# Patient Record
Sex: FEMALE | Race: OTHER | NOT HISPANIC OR LATINO
[De-identification: names, ages, dates, MRNs, and addresses within clinical notes are randomized per-mention and may not be internally consistent; named-entity substitution may affect disease eponyms.]

---

## 2017-02-23 ENCOUNTER — NON-APPOINTMENT (OUTPATIENT)
Age: 77
End: 2017-02-23

## 2017-02-23 ENCOUNTER — APPOINTMENT (OUTPATIENT)
Dept: INTERNAL MEDICINE | Facility: CLINIC | Age: 77
End: 2017-02-23

## 2017-02-23 VITALS
OXYGEN SATURATION: 98 % | TEMPERATURE: 98.3 F | WEIGHT: 129 LBS | DIASTOLIC BLOOD PRESSURE: 70 MMHG | SYSTOLIC BLOOD PRESSURE: 200 MMHG | HEIGHT: 60 IN | BODY MASS INDEX: 25.32 KG/M2 | HEART RATE: 68 BPM

## 2017-02-23 LAB — HBA1C MFR BLD HPLC: 8.5

## 2017-02-24 LAB
25(OH)D3 SERPL-MCNC: 37.5 NG/ML
ALBUMIN SERPL ELPH-MCNC: 4.1 G/DL
ALP BLD-CCNC: 103 U/L
ALT SERPL-CCNC: 19 U/L
ANION GAP SERPL CALC-SCNC: 16 MMOL/L
APPEARANCE: CLEAR
AST SERPL-CCNC: 18 U/L
BASOPHILS # BLD AUTO: 0.01 K/UL
BASOPHILS NFR BLD AUTO: 0.1 %
BILIRUB SERPL-MCNC: 0.5 MG/DL
BILIRUBIN URINE: NEGATIVE
BLOOD URINE: NEGATIVE
BUN SERPL-MCNC: 17 MG/DL
CALCIUM SERPL-MCNC: 9.1 MG/DL
CHLORIDE SERPL-SCNC: 101 MMOL/L
CHOLEST SERPL-MCNC: 253 MG/DL
CHOLEST/HDLC SERPL: 3 RATIO
CO2 SERPL-SCNC: 22 MMOL/L
COLOR: YELLOW
CREAT SERPL-MCNC: 0.75 MG/DL
EOSINOPHIL # BLD AUTO: 0.07 K/UL
EOSINOPHIL NFR BLD AUTO: 0.9 %
GLUCOSE QUALITATIVE U: 500 MG/DL
GLUCOSE SERPL-MCNC: 284 MG/DL
HCT VFR BLD CALC: 38.6 %
HDLC SERPL-MCNC: 84 MG/DL
HGB BLD-MCNC: 12.8 G/DL
IMM GRANULOCYTES NFR BLD AUTO: 0.3 %
KETONES URINE: NEGATIVE
LDLC SERPL CALC-MCNC: 144 MG/DL
LEUKOCYTE ESTERASE URINE: NEGATIVE
LYMPHOCYTES # BLD AUTO: 2.1 K/UL
LYMPHOCYTES NFR BLD AUTO: 26.8 %
MAN DIFF?: NORMAL
MCHC RBC-ENTMCNC: 28.5 PG
MCHC RBC-ENTMCNC: 33.2 GM/DL
MCV RBC AUTO: 86 FL
MONOCYTES # BLD AUTO: 0.69 K/UL
MONOCYTES NFR BLD AUTO: 8.8 %
NEUTROPHILS # BLD AUTO: 4.96 K/UL
NEUTROPHILS NFR BLD AUTO: 63.1 %
NITRITE URINE: NEGATIVE
PH URINE: 6.5
PLATELET # BLD AUTO: 263 K/UL
POTASSIUM SERPL-SCNC: 4.4 MMOL/L
PROT SERPL-MCNC: 6.6 G/DL
PROTEIN URINE: NEGATIVE MG/DL
RBC # BLD: 4.49 M/UL
RBC # FLD: 14 %
SODIUM SERPL-SCNC: 139 MMOL/L
SPECIFIC GRAVITY URINE: 1.03
TRIGL SERPL-MCNC: 126 MG/DL
TSH SERPL-ACNC: 1.73 UIU/ML
UROBILINOGEN URINE: NORMAL MG/DL
VIT B12 SERPL-MCNC: 1198 PG/ML
WBC # FLD AUTO: 7.85 K/UL

## 2017-03-01 LAB — HEMOCCULT STL QL IA: NEGATIVE

## 2017-06-02 ENCOUNTER — MEDICATION RENEWAL (OUTPATIENT)
Age: 77
End: 2017-06-02

## 2017-07-24 ENCOUNTER — APPOINTMENT (OUTPATIENT)
Dept: INTERNAL MEDICINE | Facility: CLINIC | Age: 77
End: 2017-07-24

## 2017-09-29 ENCOUNTER — APPOINTMENT (OUTPATIENT)
Dept: INTERNAL MEDICINE | Facility: CLINIC | Age: 77
End: 2017-09-29
Payer: MEDICARE

## 2017-09-29 VITALS
BODY MASS INDEX: 24.94 KG/M2 | TEMPERATURE: 98.3 F | HEIGHT: 60 IN | WEIGHT: 127 LBS | DIASTOLIC BLOOD PRESSURE: 62 MMHG | SYSTOLIC BLOOD PRESSURE: 200 MMHG | OXYGEN SATURATION: 98 % | HEART RATE: 71 BPM

## 2017-09-29 LAB — HBA1C MFR BLD HPLC: 8.3

## 2017-09-29 PROCEDURE — 99214 OFFICE O/P EST MOD 30 MIN: CPT | Mod: 25

## 2017-09-29 PROCEDURE — 83036 HEMOGLOBIN GLYCOSYLATED A1C: CPT | Mod: QW

## 2017-09-29 RX ORDER — METFORMIN ER 500 MG 500 MG/1
500 TABLET ORAL DAILY
Qty: 90 | Refills: 1 | Status: DISCONTINUED | COMMUNITY
Start: 2017-02-23 | End: 2017-09-29

## 2017-10-02 LAB
APPEARANCE: CLEAR
BILIRUBIN URINE: NEGATIVE
BLOOD URINE: NEGATIVE
COLOR: YELLOW
CREAT SPEC-SCNC: 10 MG/DL
GLUCOSE QUALITATIVE U: NORMAL MG/DL
KETONES URINE: NEGATIVE
LEUKOCYTE ESTERASE URINE: NEGATIVE
MICROALBUMIN 24H UR DL<=1MG/L-MCNC: 0.9 MG/DL
MICROALBUMIN/CREAT 24H UR-RTO: 90 MG/G
NITRITE URINE: NEGATIVE
PH URINE: 6
PROTEIN URINE: NEGATIVE MG/DL
SPECIFIC GRAVITY URINE: 1.01
UROBILINOGEN URINE: NORMAL MG/DL

## 2017-12-26 ENCOUNTER — MEDICATION RENEWAL (OUTPATIENT)
Age: 77
End: 2017-12-26

## 2018-02-26 ENCOUNTER — APPOINTMENT (OUTPATIENT)
Dept: INTERNAL MEDICINE | Facility: CLINIC | Age: 78
End: 2018-02-26
Payer: MEDICARE

## 2018-02-26 ENCOUNTER — LABORATORY RESULT (OUTPATIENT)
Age: 78
End: 2018-02-26

## 2018-02-26 VITALS
TEMPERATURE: 98.1 F | BODY MASS INDEX: 25.91 KG/M2 | HEART RATE: 63 BPM | WEIGHT: 132 LBS | SYSTOLIC BLOOD PRESSURE: 160 MMHG | DIASTOLIC BLOOD PRESSURE: 80 MMHG | HEIGHT: 60 IN | OXYGEN SATURATION: 97 %

## 2018-02-26 LAB — HBA1C MFR BLD HPLC: 6.6

## 2018-02-26 PROCEDURE — G0439: CPT | Mod: 25

## 2018-02-26 PROCEDURE — 36415 COLL VENOUS BLD VENIPUNCTURE: CPT

## 2018-02-26 PROCEDURE — 83036 HEMOGLOBIN GLYCOSYLATED A1C: CPT | Mod: QW

## 2018-02-27 LAB
25(OH)D3 SERPL-MCNC: 31.1 NG/ML
ALBUMIN SERPL ELPH-MCNC: 4.1 G/DL
ALP BLD-CCNC: 93 U/L
ALT SERPL-CCNC: 18 U/L
ANION GAP SERPL CALC-SCNC: 15 MMOL/L
APPEARANCE: CLEAR
AST SERPL-CCNC: 22 U/L
BASOPHILS # BLD AUTO: 0.02 K/UL
BASOPHILS NFR BLD AUTO: 0.2 %
BILIRUB SERPL-MCNC: 0.3 MG/DL
BILIRUBIN URINE: NEGATIVE
BLOOD URINE: NEGATIVE
BUN SERPL-MCNC: 18 MG/DL
CALCIUM SERPL-MCNC: 9.3 MG/DL
CHLORIDE SERPL-SCNC: 103 MMOL/L
CHOLEST SERPL-MCNC: 235 MG/DL
CHOLEST/HDLC SERPL: 3.3 RATIO
CO2 SERPL-SCNC: 24 MMOL/L
COLOR: ABNORMAL
CREAT SERPL-MCNC: 0.72 MG/DL
EOSINOPHIL # BLD AUTO: 0.1 K/UL
EOSINOPHIL NFR BLD AUTO: 1.2 %
GLUCOSE QUALITATIVE U: NEGATIVE MG/DL
GLUCOSE SERPL-MCNC: 164 MG/DL
HBA1C MFR BLD HPLC: 6.4 %
HCT VFR BLD CALC: 36.7 %
HDLC SERPL-MCNC: 71 MG/DL
HGB BLD-MCNC: 11.7 G/DL
IMM GRANULOCYTES NFR BLD AUTO: 0.1 %
KETONES URINE: NEGATIVE
LDLC SERPL CALC-MCNC: 126 MG/DL
LEUKOCYTE ESTERASE URINE: NEGATIVE
LYMPHOCYTES # BLD AUTO: 2.22 K/UL
LYMPHOCYTES NFR BLD AUTO: 26.6 %
MAN DIFF?: NORMAL
MCHC RBC-ENTMCNC: 27.8 PG
MCHC RBC-ENTMCNC: 31.9 GM/DL
MCV RBC AUTO: 87.2 FL
MONOCYTES # BLD AUTO: 0.78 K/UL
MONOCYTES NFR BLD AUTO: 9.4 %
NEUTROPHILS # BLD AUTO: 5.21 K/UL
NEUTROPHILS NFR BLD AUTO: 62.5 %
NITRITE URINE: NEGATIVE
PH URINE: 6
PLATELET # BLD AUTO: 276 K/UL
POTASSIUM SERPL-SCNC: 4.8 MMOL/L
PROT SERPL-MCNC: 6.8 G/DL
PROTEIN URINE: NEGATIVE MG/DL
RBC # BLD: 4.21 M/UL
RBC # FLD: 14.3 %
SODIUM SERPL-SCNC: 142 MMOL/L
SPECIFIC GRAVITY URINE: 1.02
TRIGL SERPL-MCNC: 188 MG/DL
TSH SERPL-ACNC: 1.87 UIU/ML
UROBILINOGEN URINE: NEGATIVE MG/DL
WBC # FLD AUTO: 8.34 K/UL

## 2018-03-07 LAB — HEMOCCULT STL QL IA: NEGATIVE

## 2018-03-12 ENCOUNTER — APPOINTMENT (OUTPATIENT)
Dept: OPHTHALMOLOGY | Facility: CLINIC | Age: 78
End: 2018-03-12

## 2018-03-12 ENCOUNTER — APPOINTMENT (OUTPATIENT)
Dept: INTERNAL MEDICINE | Facility: CLINIC | Age: 78
End: 2018-03-12
Payer: MEDICARE

## 2018-03-12 VITALS
HEIGHT: 60 IN | OXYGEN SATURATION: 97 % | WEIGHT: 132 LBS | BODY MASS INDEX: 25.91 KG/M2 | SYSTOLIC BLOOD PRESSURE: 160 MMHG | DIASTOLIC BLOOD PRESSURE: 70 MMHG | TEMPERATURE: 97.9 F | HEART RATE: 66 BPM

## 2018-03-12 PROCEDURE — 99214 OFFICE O/P EST MOD 30 MIN: CPT

## 2018-07-18 ENCOUNTER — APPOINTMENT (OUTPATIENT)
Dept: INTERNAL MEDICINE | Facility: CLINIC | Age: 78
End: 2018-07-18
Payer: MEDICARE

## 2018-07-18 VITALS
OXYGEN SATURATION: 98 % | HEART RATE: 66 BPM | WEIGHT: 136 LBS | HEIGHT: 60 IN | TEMPERATURE: 98.2 F | DIASTOLIC BLOOD PRESSURE: 70 MMHG | SYSTOLIC BLOOD PRESSURE: 180 MMHG | BODY MASS INDEX: 26.7 KG/M2

## 2018-07-18 PROCEDURE — 36415 COLL VENOUS BLD VENIPUNCTURE: CPT

## 2018-07-18 PROCEDURE — 99214 OFFICE O/P EST MOD 30 MIN: CPT | Mod: 25

## 2018-07-18 NOTE — ASSESSMENT
[FreeTextEntry1] : \par Hypertension- 180/70 took medications today  \par -uncontrolled confirmed , denies CP SOB or dizzy spells \par -Educate the patient side effects of uncontrolled high blood pressure including coronary artery disease, kidney failure requiring dialysis , stroke causing paralysis and death , pt verbalized agreed to be compliant with medications \par - advised if chest tightness or pain or palpitation to go to ER \par -Elevated today,  discussed low-salt diet, \par -on losartan, 100 mg daily, and amlodipine 10 mg daily - refused any change in medications \par -Followup in 3 months  to check blood pressure\par \par DM type 2 - get AIC \par -ophtho referral given \par -  discussed compliance with medications \par -Educated patient complications of uncontrolled diabetes including coronary artery disease, kidney failure, blindness, neuropathy, etc\par -Educated compliance with diet and medication\par -Also advised to make appointment to see ophthalmologist to rule out retinopathy\par -on  glipizide 2.5 1/2 tab  daily \par -refused pneumonia vaccine and flu vaccine \par -Advice to take statin and ACE inhibitor-- patient refused statins, \par \par Insomnia- sleep hygeine reviewed \par - start melatonin - increase to 8mg daily \par \par Health maintenance\par -Patient verbalizes understands risk of cancer-refused colonoscopy, mammogram, Pap smear, and all vaccinations. FIT negative \par FIT 2018 feb negative \par

## 2018-07-18 NOTE — HISTORY OF PRESENT ILLNESS
[de-identified] : c/o insomnia - goes to bed at 10- 11 - falls a sleep for 10 minutes then has problem going back to sleep till am \par \par DM- \par - taking glipizide 2.5 daily 1/2 tablet \par -since 5 yrs not taking metformin 500 -when ever she takes it her stomach crams and diarrhea , sedentary life , last eye exam was 3 years ago, when she had her cataract surgery, does not believe in vaccination\par -Does not monitor her sugars\par -Tries to eat low carbohydrate diet\par \par HTN- x 3 yrs taking amlodipine 10 mg ,and taking losartan 50 > 1 yr took medication this am 9 for last 2 weeks \par -Denies any chest pain, palpitation, or dizzy spells, + sob with exertion and increased stress for last 3 weeks due to family issue , No h/o asthma \par -used to see cardiology last visit was 4 years ago , cardiac work up was 15 yrs ago \par

## 2018-07-19 LAB
ANION GAP SERPL CALC-SCNC: 16 MMOL/L
BUN SERPL-MCNC: 21 MG/DL
CALCIUM SERPL-MCNC: 9.7 MG/DL
CHLORIDE SERPL-SCNC: 104 MMOL/L
CO2 SERPL-SCNC: 23 MMOL/L
CREAT SERPL-MCNC: 0.85 MG/DL
CREAT SPEC-SCNC: 75 MG/DL
GLUCOSE SERPL-MCNC: 240 MG/DL
HBA1C MFR BLD HPLC: 7.7 %
MICROALBUMIN 24H UR DL<=1MG/L-MCNC: 3 MG/DL
MICROALBUMIN/CREAT 24H UR-RTO: 40 MG/G
POTASSIUM SERPL-SCNC: 4.9 MMOL/L
SODIUM SERPL-SCNC: 143 MMOL/L

## 2018-08-16 ENCOUNTER — APPOINTMENT (OUTPATIENT)
Dept: OPHTHALMOLOGY | Facility: CLINIC | Age: 78
End: 2018-08-16

## 2019-02-25 ENCOUNTER — MEDICATION RENEWAL (OUTPATIENT)
Age: 79
End: 2019-02-25

## 2019-02-27 ENCOUNTER — APPOINTMENT (OUTPATIENT)
Dept: INTERNAL MEDICINE | Facility: CLINIC | Age: 79
End: 2019-02-27
Payer: MEDICARE

## 2019-02-27 VITALS
OXYGEN SATURATION: 98 % | SYSTOLIC BLOOD PRESSURE: 140 MMHG | HEART RATE: 66 BPM | DIASTOLIC BLOOD PRESSURE: 70 MMHG | BODY MASS INDEX: 26.31 KG/M2 | TEMPERATURE: 97.9 F | HEIGHT: 60 IN | WEIGHT: 134 LBS

## 2019-02-27 PROCEDURE — G0439: CPT | Mod: 25

## 2019-02-27 PROCEDURE — G0444 DEPRESSION SCREEN ANNUAL: CPT

## 2019-02-27 PROCEDURE — 36415 COLL VENOUS BLD VENIPUNCTURE: CPT

## 2019-02-27 NOTE — HISTORY OF PRESENT ILLNESS
[FreeTextEntry1] : came in for annual check up \par gets medications form [facility]  [de-identified] : c/o insomnia - goes to bed at 10- 11 - falls a sleep for 10 minutes then has problem going back to sleep till am \par \par DM- \par - still taking glipizide 2.5 daily 1/2 tablet was told to increase to 1 tab 7/2018 after AIC at visit was 7.7 \par -hx of non compliance with mediations in past  not taking metformin 500 -when ever she takes it her stomach crams and diarrhea , sedentary life , last eye exam was 3 years ago, when she had her cataract surgery, does not believe in vaccination\par -Does not monitor her sugars\par -Tries to eat low carbohydrate diet\par \par HTN- as per pt she was in [place] 3 months [place] and doing homeopathic rx and her bp was 130/70 there therefore stopped losartan \par -only  taking amlodipine 10 mg stopped taking losartan 50 > 6 months \par -Denies any chest pain, palpitation, or dizzy spells, + sob with exertion and increased stress for last 3 weeks due to family issue , No h/o asthma \par -used to see cardiology last visit was 4 years ago , cardiac work up was 15 yrs ago \par

## 2019-02-27 NOTE — ASSESSMENT
[FreeTextEntry1] : \par Hypertension- 200/70 due to non comp with medications \par -uncontrolled confirmed , denies CP SOB or dizzy spells \par -Educate the patient side effects of uncontrolled high blood pressure including coronary artery disease, kidney failure requiring dialysis , stroke causing paralysis and death , pt verbalized agreed to be compliant with medications \par - advised if chest tightness or pain or palpitation to go to ER \par -Elevated today,  discussed low-salt diet, \par -restart losartan, 100 mg daily, and continue amlodipine 10 mg daily - refused any change in medications \par -Followup in 3 months  to check blood pressure\par \par DM type 2 - get AIC \par -ophtho referral given \par -  discussed compliance with medications \par -Educated patient complications of uncontrolled diabetes including coronary artery disease, kidney failure, blindness, neuropathy, etc\par -Educated compliance with diet and medication\par -Also advised to make appointment to see ophthalmologist to rule out retinopathy\par -on  glipizide 2.5 1/2 tab  daily \par -refused pneumonia vaccine and flu vaccine \par -Advice to take statin and ACE inhibitor-- patient refused statins, \par \par Insomnia- sleep hygeine reviewed \par - start melatonin - increase to 8mg daily \par \par Health maintenance\par -Patient verbalizes understands risk of cancer-refused colonoscopy, mammogram, Pap smear, and all vaccinations. FIT negative \par FIT 2018 feb negative \par

## 2019-02-27 NOTE — COUNSELING
[Weight management counseling provided] : Weight management [Healthy eating counseling provided] : healthy eating [Activity counseling provided] : activity [Patient Non-adherent to care plan] : Patient non-adherent to care plan [Patient motivation] : Patient motivation

## 2019-02-27 NOTE — HEALTH RISK ASSESSMENT
[Good] : ~his/her~  mood as  good [No falls in past year] : Patient reported no falls in the past year [0] : 2) Feeling down, depressed, or hopeless: Not at all (0) [Patient declined mammogram] : Patient declined mammogram [Patient declined PAP Smear] : Patient declined PAP Smear [Patient declined bone density test] : Patient declined bone density test [Patient declined colonoscopy] : Patient declined colonoscopy [Retired] : retired [Single] : single [Fully functional (bathing, dressing, toileting, transferring, walking, feeding)] : Fully functional (bathing, dressing, toileting, transferring, walking, feeding) [Fully functional (using the telephone, shopping, preparing meals, housekeeping, doing laundry, using] : Fully functional and needs no help or supervision to perform IADLs (using the telephone, shopping, preparing meals, housekeeping, doing laundry, using transportation, managing medications and managing finances) [] : No [[id number]] : 0 [Reports changes in hearing] : Reports no changes in hearing [Reports changes in vision] : Reports no changes in vision [Reports changes in dental health] : Reports no changes in dental health [de-identified] : with son

## 2019-02-28 LAB
ALBUMIN SERPL ELPH-MCNC: 4.2 G/DL
ALP BLD-CCNC: 103 U/L
ALT SERPL-CCNC: 21 U/L
ANION GAP SERPL CALC-SCNC: 16 MMOL/L
APPEARANCE: CLEAR
AST SERPL-CCNC: 22 U/L
BASOPHILS # BLD AUTO: 0.03 K/UL
BASOPHILS NFR BLD AUTO: 0.4 %
BILIRUB SERPL-MCNC: 0.4 MG/DL
BILIRUBIN URINE: NEGATIVE
BLOOD URINE: NEGATIVE
BUN SERPL-MCNC: 18 MG/DL
CALCIUM SERPL-MCNC: 9.5 MG/DL
CHLORIDE SERPL-SCNC: 100 MMOL/L
CHOLEST SERPL-MCNC: 266 MG/DL
CHOLEST/HDLC SERPL: 3.6 RATIO
CO2 SERPL-SCNC: 23 MMOL/L
COLOR: YELLOW
CREAT SERPL-MCNC: 0.65 MG/DL
EOSINOPHIL # BLD AUTO: 0.12 K/UL
EOSINOPHIL NFR BLD AUTO: 1.5 %
GLUCOSE QUALITATIVE U: ABNORMAL
GLUCOSE SERPL-MCNC: 254 MG/DL
HBA1C MFR BLD HPLC: 7.7 %
HCT VFR BLD CALC: 42.5 %
HDLC SERPL-MCNC: 74 MG/DL
HGB BLD-MCNC: 12.9 G/DL
IMM GRANULOCYTES NFR BLD AUTO: 0.4 %
KETONES URINE: NEGATIVE
LDLC SERPL CALC-MCNC: 163 MG/DL
LEUKOCYTE ESTERASE URINE: NEGATIVE
LYMPHOCYTES # BLD AUTO: 2.36 K/UL
LYMPHOCYTES NFR BLD AUTO: 28.7 %
MAN DIFF?: NORMAL
MCHC RBC-ENTMCNC: 27.7 PG
MCHC RBC-ENTMCNC: 30.4 GM/DL
MCV RBC AUTO: 91.4 FL
MONOCYTES # BLD AUTO: 0.8 K/UL
MONOCYTES NFR BLD AUTO: 9.7 %
NEUTROPHILS # BLD AUTO: 4.88 K/UL
NEUTROPHILS NFR BLD AUTO: 59.3 %
NITRITE URINE: NEGATIVE
PH URINE: 7
PLATELET # BLD AUTO: 259 K/UL
POTASSIUM SERPL-SCNC: 4.9 MMOL/L
PROT SERPL-MCNC: 6.7 G/DL
PROTEIN URINE: NORMAL
RBC # BLD: 4.65 M/UL
RBC # FLD: 13.2 %
SODIUM SERPL-SCNC: 139 MMOL/L
SPECIFIC GRAVITY URINE: 1.02
TRIGL SERPL-MCNC: 147 MG/DL
TSH SERPL-ACNC: 1.85 UIU/ML
UROBILINOGEN URINE: NORMAL
VIT B12 SERPL-MCNC: 1665 PG/ML
WBC # FLD AUTO: 8.22 K/UL

## 2019-03-03 LAB — 25(OH)D3 SERPL-MCNC: 37.9 NG/ML

## 2019-08-02 ENCOUNTER — APPOINTMENT (OUTPATIENT)
Dept: INTERNAL MEDICINE | Facility: CLINIC | Age: 79
End: 2019-08-02

## 2019-08-03 ENCOUNTER — OTHER (OUTPATIENT)
Age: 79
End: 2019-08-03

## 2019-08-13 ENCOUNTER — APPOINTMENT (OUTPATIENT)
Dept: INTERNAL MEDICINE | Facility: CLINIC | Age: 79
End: 2019-08-13

## 2019-09-06 ENCOUNTER — APPOINTMENT (OUTPATIENT)
Dept: INTERNAL MEDICINE | Facility: CLINIC | Age: 79
End: 2019-09-06
Payer: MEDICARE

## 2019-09-06 VITALS
BODY MASS INDEX: 25.32 KG/M2 | OXYGEN SATURATION: 98 % | TEMPERATURE: 97.9 F | SYSTOLIC BLOOD PRESSURE: 156 MMHG | DIASTOLIC BLOOD PRESSURE: 64 MMHG | WEIGHT: 129 LBS | HEIGHT: 60 IN | HEART RATE: 75 BPM

## 2019-09-06 PROCEDURE — 36415 COLL VENOUS BLD VENIPUNCTURE: CPT

## 2019-09-06 PROCEDURE — 99214 OFFICE O/P EST MOD 30 MIN: CPT | Mod: 25

## 2019-09-06 NOTE — HISTORY OF PRESENT ILLNESS
[de-identified] : went to hospital - 8/3/19 with left face numbness - was admitted dx as stroke - monitored her and let her go next day - dx as TIA \par -before this pt was not taking her losartan asd she saw in news it was carcinogenic - 3 months \par -was discharged on Asprin 81 , amlodipine 10 and lisinopril 10 \par \par DM- \par - still taking glipizide 2.5 daily 1/2 tablet daily \par -hx of non compliance with mediations in past not taking metformin 500 -when ever she takes it her stomach crams and diarrhea , sedentary life , last eye exam was 3 years ago, when she had her cataract surgery, does not believe in vaccination\par -Does not monitor her sugars\par -Tries to eat low carbohydrate diet\par \par HTN- \par -only taking amlodipine 10 mg , stopped losartan > 6 months - started on lisinopril 10 from ER 8/2019 ran out 2 days ago \par -Denies any chest pain, palpitation, or dizzy spells, + sob with exertion and increased stress for last 3 weeks due to family issue , No h/o asthma \par -used to see cardiology last visit was 4 years ago , cardiac work up was 15 yrs ago \par

## 2019-09-06 NOTE — ASSESSMENT
[FreeTextEntry1] : hx TIA 8/2019 - self discontinued asprin \par - educated to restart \par \par Hypertension- 170/70  due to non comp with medications \par -uncontrolled confirmed , denies CP SOB or dizzy spells \par -Educate the patient side effects of uncontrolled high blood pressure including coronary artery disease, kidney failure requiring dialysis , stroke causing paralysis and death , pt verbalized agreed to be compliant with medications \par - advised if chest tightness or pain or palpitation to go to ER \par -Elevated today, discussed low-salt diet, \par -restart lisinopril 10 mg daily, and continue amlodipine 10 mg daily - refused any change in medications \par -Followup in 3 months to check blood pressure\par \par DM type 2 - get AIC \par -ophtho referral given \par - discussed compliance with medications \par -Educated patient complications of uncontrolled diabetes including coronary artery disease, kidney failure, blindness, neuropathy, etc\par -Educated compliance with diet and medication\par -Also advised to make appointment to see ophthalmologist to rule out retinopathy\par -on glipizide 2.5 1/2 tab daily \par -refused pneumonia vaccine and flu vaccine \par -Advice to take statin and ACE inhibitor-- patient refused statins, \par \par Insomnia- sleep hygeine reviewed \par - start melatonin - increase to 8mg daily \par \par Health maintenance\par -Patient verbalizes understands risk of cancer-refused colonoscopy, mammogram, Pap smear, and all vaccinations. FIT negative \par FIT 2018 feb negative \par

## 2019-09-09 ENCOUNTER — MEDICATION RENEWAL (OUTPATIENT)
Age: 79
End: 2019-09-09

## 2019-09-09 LAB
ALBUMIN SERPL ELPH-MCNC: 4.6 G/DL
ALP BLD-CCNC: 99 U/L
ALT SERPL-CCNC: 15 U/L
ANION GAP SERPL CALC-SCNC: 15 MMOL/L
AST SERPL-CCNC: 18 U/L
BILIRUB SERPL-MCNC: 0.3 MG/DL
BUN SERPL-MCNC: 16 MG/DL
CALCIUM SERPL-MCNC: 9.8 MG/DL
CHLORIDE SERPL-SCNC: 104 MMOL/L
CHOLEST SERPL-MCNC: 260 MG/DL
CHOLEST/HDLC SERPL: 3.3 RATIO
CO2 SERPL-SCNC: 22 MMOL/L
CREAT SERPL-MCNC: 0.67 MG/DL
CREAT SPEC-SCNC: 40 MG/DL
ESTIMATED AVERAGE GLUCOSE: 157 MG/DL
GLUCOSE SERPL-MCNC: 140 MG/DL
HBA1C MFR BLD HPLC: 7.1 %
HDLC SERPL-MCNC: 78 MG/DL
LDLC SERPL CALC-MCNC: 151 MG/DL
MICROALBUMIN 24H UR DL<=1MG/L-MCNC: 3.4 MG/DL
MICROALBUMIN/CREAT 24H UR-RTO: 84 MG/G
POTASSIUM SERPL-SCNC: 4.8 MMOL/L
PROT SERPL-MCNC: 7.4 G/DL
SODIUM SERPL-SCNC: 141 MMOL/L
TRIGL SERPL-MCNC: 157 MG/DL

## 2019-09-09 RX ORDER — ASPIRIN 81 MG/1
81 TABLET, COATED ORAL
Qty: 30 | Refills: 0 | Status: ACTIVE | COMMUNITY
Start: 2019-08-03

## 2019-10-31 ENCOUNTER — APPOINTMENT (OUTPATIENT)
Dept: INTERNAL MEDICINE | Facility: CLINIC | Age: 79
End: 2019-10-31
Payer: MEDICARE

## 2019-10-31 ENCOUNTER — RX RENEWAL (OUTPATIENT)
Age: 79
End: 2019-10-31

## 2019-10-31 VITALS
TEMPERATURE: 98.1 F | HEART RATE: 69 BPM | SYSTOLIC BLOOD PRESSURE: 140 MMHG | OXYGEN SATURATION: 97 % | DIASTOLIC BLOOD PRESSURE: 50 MMHG | BODY MASS INDEX: 25.91 KG/M2 | HEIGHT: 60 IN | WEIGHT: 132 LBS

## 2019-10-31 PROCEDURE — 99214 OFFICE O/P EST MOD 30 MIN: CPT

## 2019-10-31 NOTE — ASSESSMENT
[FreeTextEntry1] : hx TIA 8/2019 - self discontinued asprin \par - educated risk of stroke - pt verbalised understands does not believe in Pharmasuticals \par \par .high clolesterol- did not start atorvastatin she read up on article in [place] - statin causes Alzheiners dementia - she is willing to risk stroke than to get AD - she will try natural rx redyeast \par \par Hypertension- 150/60 \par -uncontrolled confirmed , denies CP SOB or dizzy spells \par -Educate the patient side effects of uncontrolled high blood pressure including coronary artery disease, kidney failure requiring dialysis , stroke causing paralysis and death , pt verbalized agreed to be compliant with medications \par - advised if chest tightness or pain or palpitation to go to ER \par -Elevated today, discussed low-salt diet, \par -restart lisinopril 10 mg daily, and continue amlodipine 10 mg daily - refused any change in medications \par -Followup in 3 months to check blood pressure\par \par DM type 2 -AIC 7.1 \par -ophtho referral given \par - discussed compliance with medications \par -Educated patient complications of uncontrolled diabetes including coronary artery disease, kidney failure, blindness, neuropathy, etc\par -Educated compliance with diet and medication\par -Also advised to make appointment to see ophthalmologist to rule out retinopathy\par -on glipizide 2.5 1/2 tab daily \par -refused pneumonia vaccine and flu vaccine \par -Advice to take statin and ACE inhibitor-- patient refused statins, \par \par Insomnia- sleep hygeine reviewed \par - start melatonin - increase to 8mg daily \par \par Health maintenance\par -Patient verbalizes understands risk of cancer-refused colonoscopy, mammogram, Pap smear, and all vaccinations. FIT negative \par FIT 2018 feb negative

## 2019-10-31 NOTE — HISTORY OF PRESENT ILLNESS
[Other: _____] : [unfilled] [de-identified] : hx TIA 8/2019 - was told to take  Asprin 81 daily - pt self discontinued -never started atorvastatin as discuseed last visit - does not belive in pharmasuticals - only like to take natural rx - pt educated risk of stroke - verbalised understands refuses to take asprin or statin \par \par DM- \par - still taking glipizide 2.5 daily 1/2 tablet daily \par -hx of non compliance with mediations in past not taking metformin 500 -when ever she takes it her stomach crams and diarrhea , sedentary life , last eye exam was 3 years ago, when she had her cataract surgery, does not believe in vaccination\par -Does not monitor her sugars\par -Tries to eat low carbohydrate diet\par \par HTN- \par -taking amlodipine 10 mg ,and lisinopril 10 from ER 8/2019 \par -Denies any chest pain, palpitation, or dizzy spells, + sob with exertion and increased stress for last 3 weeks due to family issue , No h/o asthma \par -used to see cardiology last visit was 4 years ago , cardiac work up was 15 yrs ago

## 2020-02-26 ENCOUNTER — APPOINTMENT (OUTPATIENT)
Dept: INTERNAL MEDICINE | Facility: CLINIC | Age: 80
End: 2020-02-26
Payer: MEDICARE

## 2020-02-26 VITALS
WEIGHT: 131 LBS | DIASTOLIC BLOOD PRESSURE: 50 MMHG | OXYGEN SATURATION: 98 % | HEART RATE: 66 BPM | BODY MASS INDEX: 25.72 KG/M2 | HEIGHT: 60 IN | TEMPERATURE: 98.7 F | SYSTOLIC BLOOD PRESSURE: 160 MMHG

## 2020-02-26 PROCEDURE — 99214 OFFICE O/P EST MOD 30 MIN: CPT | Mod: 25

## 2020-02-26 PROCEDURE — 36415 COLL VENOUS BLD VENIPUNCTURE: CPT

## 2020-02-26 NOTE — ASSESSMENT
[FreeTextEntry1] : cough with white phlem - rx for robutussin DM send x 1 week \par \par hx TIA 8/2019 - self discontinued asprin \par - educated risk of stroke - pt verbalised understands does not believe in Pharmasuticals \par \par .high cholesterol- did not start atorvastatin she read up on article in [place] - statin causes Alzheimers dementia - she is willing to risk stroke than to get AD - she will try natural rx red yeast \par \par Hypertension- 160/60 today \par -uncontrolled confirmed , denies CP SOB or dizzy spells \par -Educate the patient side effects of uncontrolled high blood pressure including coronary artery disease, kidney failure requiring dialysis , stroke causing paralysis and death , pt verbalized agreed to be compliant with medications \par - advised if chest tightness or pain or palpitation to go to ER \par -Elevated today, discussed low-salt diet, \par -refilled lisinopril 10 mg daily, and continue amlodipine 10 mg daily - refused any change in medications \par -Followup in 3 months to check blood pressure\par \par DM type 2 -AIC 7.1 \par -ophtho referral given \par - discussed compliance with medications \par -Educated patient complications of uncontrolled diabetes including coronary artery disease, kidney failure, blindness, neuropathy, etc\par -Educated compliance with diet and medication\par -Also advised to make appointment to see ophthalmologist to rule out retinopathy\par -on glipizide 2.5 1/2 tab daily \par -refused pneumonia vaccine and flu vaccine \par -Advice to take statin and ACE inhibitor-- patient refused statins, \par \par Insomnia- sleep hygeine reviewed \par - start melatonin - increase to 8mg daily \par \par Health maintenance\par -Patient verbalizes understands risk of cancer-refused colonoscopy, mammogram, Pap smear, and all vaccinations. FIT negative \par FIT 2018 feb negative. \par \par

## 2020-02-26 NOTE — HISTORY OF PRESENT ILLNESS
[de-identified] : came in for appt. 2 days early - cannot do CPE as CPE due after 2/27 did F/u on ch medical issues \par \par c/o productive cough - white phelm x few days - no fevr , PND or congestion , no sore throat no  or GI symptoms \par \par hx TIA 8/2019 - was told to take Asprin 81 daily - pt self discontinued -never started atorvastatin as discussed last visit - does not believe in pharmaceutical - only like to take natural rx - pt educated risk of stroke - verbalized understands refuses to take aspirin or statin \par \par DM- \par - still taking glipizide 2.5 daily 1/2 tablet daily \par -hx of non compliance with mediations in past not taking metformin 500 -when ever she takes it her stomach crams and diarrhea , sedentary life , last eye exam was 3 years ago, when she had her cataract surgery, does not believe in vaccination\par -Does not monitor her sugars\par -Tries to eat low carbohydrate diet\par \par HTN- \par -taking amlodipine 10 mg ,and lisinopril 10 from ER 8/2019 \par -Denies any chest pain, palpitation, or dizzy spells, + sob with exertion and increased stress for last 3 weeks due to family issue , No h/o asthma \par -used to see cardiology last visit was 4 years ago , cardiac work up was 15 yrs ago \par  \par

## 2020-02-27 LAB
CHOLEST SERPL-MCNC: 246 MG/DL
CHOLEST/HDLC SERPL: 3.4 RATIO
ESTIMATED AVERAGE GLUCOSE: 157 MG/DL
HBA1C MFR BLD HPLC: 7.1 %
HDLC SERPL-MCNC: 72 MG/DL
LDLC SERPL CALC-MCNC: 150 MG/DL
TRIGL SERPL-MCNC: 122 MG/DL

## 2020-04-27 ENCOUNTER — APPOINTMENT (OUTPATIENT)
Dept: INTERNAL MEDICINE | Facility: CLINIC | Age: 80
End: 2020-04-27
Payer: MEDICARE

## 2020-04-27 VITALS
SYSTOLIC BLOOD PRESSURE: 180 MMHG | OXYGEN SATURATION: 98 % | DIASTOLIC BLOOD PRESSURE: 60 MMHG | RESPIRATION RATE: 12 BRPM | HEART RATE: 58 BPM

## 2020-04-27 PROCEDURE — 99214 OFFICE O/P EST MOD 30 MIN: CPT

## 2020-04-27 NOTE — ASSESSMENT
[FreeTextEntry1] : 78 y/o female, DM, HTN, poorly controlled with hx of TIA in past (declines ASA and statin therapy), here with 2-3 episodes of tingling on her face in the past week.  No neurologic deficits on exam and patient is well appearing.  Symptoms may be TIA, or intermittenr facial nerve pain.  Given hx, advised maximiazing control of CV risk factors\par \par -discussed again the role of ASA and statin in decreasing Stroke and overall CV risk: patient declines\par \par -DM controlled on current meds\par \par -HTN uncontrolled:  advised: continue amlodipine 10mg daily and increase lisinopril to 20mg daily (new rx sent)\par  f/u in 1 month for BP check and labs\par

## 2020-04-27 NOTE — PHYSICAL EXAM
[No Acute Distress] : no acute distress [Well-Appearing] : well-appearing [Normal Sclera/Conjunctiva] : normal sclera/conjunctiva [PERRL] : pupils equal round and reactive to light [EOMI] : extraocular movements intact [No Lymphadenopathy] : no lymphadenopathy [Supple] : supple [Normal Oropharynx] : the oropharynx was normal [Clear to Auscultation] : lungs were clear to auscultation bilaterally [No Respiratory Distress] : no respiratory distress  [Regular Rhythm] : with a regular rhythm [Normal Rate] : normal rate  [Normal S1, S2] : normal S1 and S2 [No Edema] : there was no peripheral edema [Coordination Grossly Intact] : coordination grossly intact [de-identified] : cn grossly intact [Normal Gait] : normal gait [No Focal Deficits] : no focal deficits

## 2020-04-27 NOTE — HISTORY OF PRESENT ILLNESS
[FreeTextEntry8] : 80 y/o female with hx of TIA 8/2019 (declined ASA, statin), DM (a1c 7.1 in Feb 2020), HTN (poorly controlled in past)\par here for evaluation.\par \par Daughter contacted office on 4/24, stating patient complaining of not feeling well.  reported tinlging around eye and on face.  Given EDVIN hx/risk of stroke, advised ED evaluation, patient refused this and UC and requested appointment for eval.  \par \par Today accompanied by her two adult children\par \par Med Rec:  takes amlodipine 10 and lisinipril 10 at 7pm, glipizide ER 2.5 at 3pm\par Does not take ASA or statin, sometimes use RYR.\par \par Reports two weeks ago she had an episode of tingling on the left side of her face (beneath left eye).  No pain, no facial droop, no change in speech.  no weakness or other symptoms.  took her medicines and went to bed and symptoms resolved in a few hours.  Had another episode one week ago and again on Wednesday.  All epsiodes witnessed by her son, who reports she is otherwise normal during the events.  Patient states she is anxious at the time of the episodes and feels weak, but denies CP, SOB, dizziness, palpiations or or other symptoms with them\par \par Denies fevers, chills, cough, stomach upset or myalgias\par \par Son checks BPS at home, last two readings 176 and 180 systolic.  \par Last BP in office 169 systolic.

## 2020-05-26 ENCOUNTER — APPOINTMENT (OUTPATIENT)
Dept: INTERNAL MEDICINE | Facility: CLINIC | Age: 80
End: 2020-05-26

## 2020-06-08 ENCOUNTER — APPOINTMENT (OUTPATIENT)
Dept: INTERNAL MEDICINE | Facility: CLINIC | Age: 80
End: 2020-06-08
Payer: MEDICARE

## 2020-06-08 VITALS — SYSTOLIC BLOOD PRESSURE: 150 MMHG | DIASTOLIC BLOOD PRESSURE: 60 MMHG

## 2020-06-08 VITALS
DIASTOLIC BLOOD PRESSURE: 60 MMHG | HEIGHT: 60 IN | OXYGEN SATURATION: 98 % | SYSTOLIC BLOOD PRESSURE: 160 MMHG | HEART RATE: 60 BPM | WEIGHT: 137 LBS | BODY MASS INDEX: 26.9 KG/M2 | TEMPERATURE: 98 F

## 2020-06-08 PROCEDURE — 36415 COLL VENOUS BLD VENIPUNCTURE: CPT

## 2020-06-08 PROCEDURE — 99214 OFFICE O/P EST MOD 30 MIN: CPT | Mod: 25

## 2020-06-08 RX ORDER — DEXTROMETHORPHAN HBR, GUAIFENESIN 20; 200 MG/10ML; MG/10ML
10-100 SOLUTION ORAL EVERY 4 HOURS
Qty: 1 | Refills: 0 | Status: DISCONTINUED | COMMUNITY
Start: 2020-02-26 | End: 2020-06-08

## 2020-06-08 RX ORDER — LISINOPRIL 20 MG/1
20 TABLET ORAL DAILY
Qty: 90 | Refills: 1 | Status: DISCONTINUED | COMMUNITY
Start: 2019-09-06 | End: 2020-06-08

## 2020-06-08 NOTE — HISTORY OF PRESENT ILLNESS
[Other: _____] : [unfilled] [de-identified] : f/u on ch medical issues \par \par c/o cough dry to white Phleum - sp at night bad cough x 4-6 months, took Robitussin no help \par  \par hx TIA 8/2019 -had a scare in April with left face numbness \par  was told to take Asprin 81 daily - pt self discontinued -never started atorvastatin as discussed last visit - does not believe in pharmaceutical - only like to take natural rx - pt educated risk of stroke - verbalized understands refuses to take aspirin or statin \par \par DM- \par - still taking glipizide 2.5 daily 1/2 tablet daily \par -hx of non compliance with mediations in past not taking metformin 500 -when ever she takes it her stomach crams and diarrhea , sedentary life , last eye exam was 3 years ago, when she had her cataract surgery, does not believe in vaccination\par -Does not monitor her sugars\par -Tries to eat low carbohydrate diet\par \par HTN- \par -taking amlodipine 10 mg ,and lisinopril 10 from ER 8/2019 \par -Denies any chest pain, palpitation, or dizzy spells, + sob with exertion and increased stress for last 3 weeks due to family issue , No h/o asthma \par -used to see cardiology last visit was 4 years ago , cardiac work up was 15 yrs ago \par

## 2020-06-08 NOTE — ASSESSMENT
[FreeTextEntry1] : Dry cough could be ACEI - will d/c lisinopril start Losartan HCTZ 100-12.5 \par -f/u 4 weeks to check BP and cough \par - trial of Tessalon pearls \par \par hx TIA 8/2019 - self discontinued asprin and statin \par - educated risk of stroke - pt verbalized understands does not believe in Pharmacists \par \par .high cholesterol- did not start atorvastatin she read up on article in [place] - statin causes Alzheimers dementia - she is willing to risk stroke than to get AD - she will try natural rx red yeast \par -get Lipid panel \par \par Hypertension- 150/60 today \par -uncontrolled confirmed , denies CP SOB or dizzy spells \par -Educate the patient side effects of uncontrolled high blood pressure including coronary artery disease, kidney failure requiring dialysis , stroke causing paralysis and death , pt verbalized agreed to be compliant with medications \par - advised if chest tightness or pain or palpitation to go to ER \par -Elevated today, discussed low-salt diet, \par -refilled lisinopril 10 mg daily, and continue amlodipine 10 mg daily - refused any change in medications \par -Followup in 3 months to check blood pressure\par \par DM type 2 -get AIC \par -AIC 7.1 2/2020\par -ophtho referral given \par - discussed compliance with medications \par -Educated patient complications of uncontrolled diabetes including coronary artery disease, kidney failure, blindness, neuropathy, etc\par -Educated compliance with diet and medication\par -Also advised to make appointment to see ophthalmologist to rule out retinopathy\par -on glipizide 2.5 1/2 tab daily \par -refused pneumonia vaccine and flu vaccine \par -Advice to take statin and ACE inhibitor-- patient refused statins, \par \par Insomnia- sleep hygienes reviewed \par - start melatonin - increase to 8mg daily \par \par Health maintenance\par -Patient verbalizes understands risk of cancer-refused colonoscopy, mammogram, Pap smear, and all vaccinations. FIT negative \par FIT 2018 feb negative. \par

## 2020-06-09 LAB
25(OH)D3 SERPL-MCNC: 49.1 NG/ML
ALBUMIN SERPL ELPH-MCNC: 4.4 G/DL
ALP BLD-CCNC: 88 U/L
ALT SERPL-CCNC: 15 U/L
ANION GAP SERPL CALC-SCNC: 14 MMOL/L
APPEARANCE: CLEAR
AST SERPL-CCNC: 17 U/L
BASOPHILS # BLD AUTO: 0.02 K/UL
BASOPHILS NFR BLD AUTO: 0.2 %
BILIRUB SERPL-MCNC: 0.2 MG/DL
BILIRUBIN URINE: NEGATIVE
BLOOD URINE: NEGATIVE
BUN SERPL-MCNC: 21 MG/DL
CALCIUM SERPL-MCNC: 9.1 MG/DL
CHLORIDE SERPL-SCNC: 102 MMOL/L
CHOLEST SERPL-MCNC: 220 MG/DL
CHOLEST/HDLC SERPL: 3.2 RATIO
CO2 SERPL-SCNC: 22 MMOL/L
COLOR: NORMAL
CREAT SERPL-MCNC: 0.75 MG/DL
EOSINOPHIL # BLD AUTO: 0.11 K/UL
EOSINOPHIL NFR BLD AUTO: 1 %
ESTIMATED AVERAGE GLUCOSE: 157 MG/DL
GLUCOSE QUALITATIVE U: NEGATIVE
GLUCOSE SERPL-MCNC: 186 MG/DL
HBA1C MFR BLD HPLC: 7.1 %
HCT VFR BLD CALC: 39.5 %
HDLC SERPL-MCNC: 69 MG/DL
HGB BLD-MCNC: 12.3 G/DL
IMM GRANULOCYTES NFR BLD AUTO: 0.4 %
KETONES URINE: NEGATIVE
LDLC SERPL CALC-MCNC: 114 MG/DL
LEUKOCYTE ESTERASE URINE: NEGATIVE
LYMPHOCYTES # BLD AUTO: 1.83 K/UL
LYMPHOCYTES NFR BLD AUTO: 17.3 %
MAN DIFF?: NORMAL
MCHC RBC-ENTMCNC: 28.5 PG
MCHC RBC-ENTMCNC: 31.1 GM/DL
MCV RBC AUTO: 91.6 FL
MONOCYTES # BLD AUTO: 0.85 K/UL
MONOCYTES NFR BLD AUTO: 8 %
NEUTROPHILS # BLD AUTO: 7.71 K/UL
NEUTROPHILS NFR BLD AUTO: 73.1 %
NITRITE URINE: NEGATIVE
PH URINE: 5.5
PLATELET # BLD AUTO: 297 K/UL
POTASSIUM SERPL-SCNC: 5.2 MMOL/L
PROT SERPL-MCNC: 6.5 G/DL
PROTEIN URINE: NORMAL
RBC # BLD: 4.31 M/UL
RBC # FLD: 13.1 %
SODIUM SERPL-SCNC: 139 MMOL/L
SPECIFIC GRAVITY URINE: 1.02
TRIGL SERPL-MCNC: 182 MG/DL
TSH SERPL-ACNC: 1.45 UIU/ML
UROBILINOGEN URINE: NORMAL
VIT B12 SERPL-MCNC: 1076 PG/ML
WBC # FLD AUTO: 10.56 K/UL

## 2020-07-28 ENCOUNTER — APPOINTMENT (OUTPATIENT)
Dept: INTERNAL MEDICINE | Facility: CLINIC | Age: 80
End: 2020-07-28
Payer: MEDICARE

## 2020-07-28 VITALS
TEMPERATURE: 98 F | WEIGHT: 130 LBS | HEART RATE: 71 BPM | DIASTOLIC BLOOD PRESSURE: 70 MMHG | OXYGEN SATURATION: 98 % | BODY MASS INDEX: 25.39 KG/M2 | SYSTOLIC BLOOD PRESSURE: 180 MMHG

## 2020-07-28 DIAGNOSIS — H91.93 UNSPECIFIED HEARING LOSS, BILATERAL: ICD-10-CM

## 2020-07-28 PROCEDURE — G0444 DEPRESSION SCREEN ANNUAL: CPT

## 2020-07-28 PROCEDURE — G0439: CPT

## 2020-07-28 RX ORDER — BENZONATATE 100 MG/1
100 CAPSULE ORAL 3 TIMES DAILY
Qty: 30 | Refills: 0 | Status: DISCONTINUED | COMMUNITY
Start: 2020-06-08 | End: 2020-07-28

## 2020-07-28 NOTE — HISTORY OF PRESENT ILLNESS
[Other: _____] : [unfilled] [de-identified] : Annual wellness visit \par \par Dry cough resolved with change of medications \par  \par hx TIA 8/2019 -had a scare in April with left face numbness \par  was told to take Asprin 81 daily - pt self discontinued -never started atorvastatin as discussed last visit - does not believe in pharmaceutical - only like to take natural rx - pt educated risk of stroke - verbalized understands refuses to take aspirin or statin \par \par DM- \par - still taking glipizide 2.5 daily 1/2 tablet daily \par -hx of non compliance with mediations in past not taking metformin 500 -when ever she takes it her stomach crams and diarrhea , sedentary life , last eye exam was 3 years ago, when she had her cataract surgery, does not believe in vaccination\par -Does not monitor her sugars\par -Tries to eat low carbohydrate diet\par \par HTN- \par -taking amlodipine 10 mg ,Losartan 100/HCT 25 daily \par -Denies any chest pain, palpitation, or dizzy spells, + sob with exertion and increased stress for last 3 weeks due to family issue , No h/o asthma \par -used to see cardiology last visit was 4 years ago , cardiac work up was 15 yrs ago \par  \par  \par

## 2020-07-28 NOTE — HEALTH RISK ASSESSMENT
[Good] : ~his/her~  mood as  good [No] : No [Never (0 pts)] : Never (0 points) [No falls in past year] : Patient reported no falls in the past year [0] : 2) Feeling down, depressed, or hopeless: Not at all (0) [None] : None [Alone] : lives alone [Retired] : retired [Single] : single [Fully functional (bathing, dressing, toileting, transferring, walking, feeding)] : Fully functional (bathing, dressing, toileting, transferring, walking, feeding) [Fully functional (using the telephone, shopping, preparing meals, housekeeping, doing laundry, using] : Fully functional and needs no help or supervision to perform IADLs (using the telephone, shopping, preparing meals, housekeeping, doing laundry, using transportation, managing medications and managing finances) [] : No [de-identified] : walking  [[id number]] : 0 [Patient declined mammogram] : Patient declined mammogram [Patient declined PAP Smear] : Patient declined PAP Smear [Patient declined bone density test] : Patient declined bone density test [Patient declined colonoscopy] : Patient declined colonoscopy [Reports changes in hearing] : Reports no changes in hearing [Reports changes in vision] : Reports no changes in vision [Reports changes in dental health] : Reports no changes in dental health

## 2020-07-28 NOTE — ASSESSMENT
[FreeTextEntry1] : Dry cough- resolved with change in medication\par \par decrease hearing B/L- referral to ENT given  \par \par hx TIA 8/2019 - self discontinued aspirin and statin \par - educated risk of stroke - pt verbalized understands does not believe in Pharmacists \par \par .high cholesterol- on atorvastatin she read up on article in [place] - statin causes Alzheimers dementia - she is willing to risk stroke than to get AD - she will try natural rx red yeast \par \par Hypertension- 180/60 today - admits to eating canned tuna for last 3 days -avoid canned foods , processed foods \par -uncontrolled confirmed , denies CP SOB or dizzy spells \par -Educate the patient side effects of uncontrolled high blood pressure including coronary artery disease, kidney failure requiring dialysis , stroke causing paralysis and death , pt verbalized agreed to be compliant with medications \par - advised if chest tightness or pain or palpitation to go to ER \par -Elevated today, discussed low-salt diet, \par -refilled all medications \par -Followup in 3 months to check blood pressure\par \par DM type 2 -get AIC \par -AIC 7.1 6/2020\par -ophtho referral given \par - discussed compliance with medications \par -Educated patient complications of uncontrolled diabetes including coronary artery disease, kidney failure, blindness, neuropathy, etc\par -Educated compliance with diet and medication\par -Also advised to make appointment to see ophthalmologist to rule out retinopathy\par -on glipizide 2.5 1/2 tab daily \par -refused pneumonia vaccine and flu vaccine \par -Advice to take statin and ACE inhibitor-- patient refused statins, \par \par Insomnia- sleep hygienes reviewed \par - start melatonin - increase to 8mg daily \par \par Health maintenance\par -Patient verbalizes understands risk of cancer-refused colonoscopy, mammogram, Pap smear, and all vaccinations. FIT negative \par FIT 2018 feb negative. \par

## 2020-10-27 ENCOUNTER — APPOINTMENT (OUTPATIENT)
Dept: INTERNAL MEDICINE | Facility: CLINIC | Age: 80
End: 2020-10-27
Payer: MEDICARE

## 2020-10-27 VITALS — SYSTOLIC BLOOD PRESSURE: 160 MMHG | DIASTOLIC BLOOD PRESSURE: 60 MMHG

## 2020-10-27 VITALS
BODY MASS INDEX: 25.58 KG/M2 | WEIGHT: 131 LBS | OXYGEN SATURATION: 98 % | TEMPERATURE: 97.34 F | HEART RATE: 84 BPM | SYSTOLIC BLOOD PRESSURE: 144 MMHG | DIASTOLIC BLOOD PRESSURE: 86 MMHG

## 2020-10-27 PROCEDURE — 99214 OFFICE O/P EST MOD 30 MIN: CPT | Mod: 25

## 2020-10-27 PROCEDURE — 36415 COLL VENOUS BLD VENIPUNCTURE: CPT

## 2020-10-27 PROCEDURE — 99072 ADDL SUPL MATRL&STAF TM PHE: CPT

## 2020-10-27 NOTE — ASSESSMENT
[FreeTextEntry1] : decrease hearing B/L- referral to ENT given \par \par GERD - adviced to take medications after her meals - and walk 20 minutes after meals \par -Educated patient lifestyle modification, advice to avoid fried food, greasy oily and spicy foods, avoid tomato, orange, lemon , or caffeinated beverages.\par -Avoid reclining upto 3 hours after meals\par -Followup in 3 months if no improvement consider EGD\par \par hx TIA 8/2019 - self discontinued aspirin and statin - still not taking statins \par - educated risk of stroke - pt verbalized understands does not believe in Pharmacists \par \par .high cholesterol- stopped atorvastatin she read up on article in [place] - statin causes Alzheimers dementia - she is willing to risk stroke than to get AD - she will try natural rx red yeast \par \par Hypertension- 160/60 today - admits to eating canned tuna - avoid canned foods , processed foods \par -uncontrolled confirmed , denies CP SOB or dizzy spells \par -Educate the patient side effects of uncontrolled high blood pressure including coronary artery disease, kidney failure requiring dialysis , stroke causing paralysis and death , pt verbalized agreed to be compliant with medications \par - advised if chest tightness or pain or palpitation to go to ER \par -Elevated today, discussed low-salt diet, \par -refilled all medications \par -Followup in 3 months to check blood pressure\par \par DM type 2 -get AIC \par -AIC 7.1 6/2020\par -ophtho referral given \par - discussed compliance with medications \par -Educated patient complications of uncontrolled diabetes including coronary artery disease, kidney failure, blindness, neuropathy, etc\par -Educated compliance with diet and medication\par -Also advised to make appointment to see ophthalmologist to rule out retinopathy\par -on glipizide 2.5 1/2 tab daily \par -refused pneumonia vaccine and flu vaccine \par -Advice to take statin and ACE inhibitor-- patient refused statins, \par \par Insomnia- sleep hygienes reviewed \par - start melatonin - increase to 8mg daily \par \par Health maintenance\par -Patient verbalizes understands risk of cancer-refused colonoscopy, mammogram, Pap smear, and all vaccinations. FIT negative \par FIT 2018 feb negative. \par  \par

## 2020-10-27 NOTE — HISTORY OF PRESENT ILLNESS
[Other: _____] : [unfilled] [de-identified] : f/u on ch medical issues \par \par c/o insomia from GERD - since she started using combo losartan /hctz - also does not like the fact she should avoid sunlight while on this medications - does eat tomato sause - rarely steps out of home - sits in corridor - concerned for sunlight exposure \par \par hx TIA 8/2019 -had a scare in April with left face numbness \par  was told to take Asprin 81 daily - pt self discontinued -never started atorvastatin as discussed last visit - does not believe in pharmaceutical - only like to take natural rx - pt educated risk of stroke - verbalized understands refuses to take aspirin or statin \par \par DM- \par - still taking glipizide 2.5 daily 1/2 tablet daily \par -hx of non compliance with mediations in past not taking metformin 500 -when ever she takes it her stomach crams and diarrhea , sedentary life , last eye exam was 3 years ago, when she had her cataract surgery, does not believe in vaccination\par -Does not monitor her sugars\par -Tries to eat low carbohydrate diet\par \par HTN- \par -taking amlodipine 10 mg ,Losartan 100/HCT 25 daily? compliance is an issue - takes meds at 3 pm did not take today pill  \par -Denies any chest pain, palpitation, or dizzy spells, + sob with exertion and increased stress for last 3 weeks due to family issue , No h/o asthma \par -used to see cardiology last visit was 4 years ago , cardiac work up was 15 yrs ago \par

## 2020-10-28 LAB
ALBUMIN SERPL ELPH-MCNC: 4.1 G/DL
ALP BLD-CCNC: 80 U/L
ALT SERPL-CCNC: 13 U/L
ANION GAP SERPL CALC-SCNC: 12 MMOL/L
AST SERPL-CCNC: 18 U/L
BASOPHILS # BLD AUTO: 0.02 K/UL
BASOPHILS NFR BLD AUTO: 0.2 %
BILIRUB SERPL-MCNC: 0.3 MG/DL
BUN SERPL-MCNC: 20 MG/DL
CALCIUM SERPL-MCNC: 8.9 MG/DL
CHLORIDE SERPL-SCNC: 103 MMOL/L
CHOLEST SERPL-MCNC: 236 MG/DL
CO2 SERPL-SCNC: 23 MMOL/L
CREAT SERPL-MCNC: 0.75 MG/DL
EOSINOPHIL # BLD AUTO: 0.16 K/UL
EOSINOPHIL NFR BLD AUTO: 1.8 %
ESTIMATED AVERAGE GLUCOSE: 148 MG/DL
GLUCOSE SERPL-MCNC: 133 MG/DL
HBA1C MFR BLD HPLC: 6.8 %
HCT VFR BLD CALC: 37.7 %
HDLC SERPL-MCNC: 70 MG/DL
HGB BLD-MCNC: 11.8 G/DL
IMM GRANULOCYTES NFR BLD AUTO: 0.3 %
LDLC SERPL CALC-MCNC: 142 MG/DL
LYMPHOCYTES # BLD AUTO: 2.52 K/UL
LYMPHOCYTES NFR BLD AUTO: 28.3 %
MAN DIFF?: NORMAL
MCHC RBC-ENTMCNC: 28.2 PG
MCHC RBC-ENTMCNC: 31.3 GM/DL
MCV RBC AUTO: 90 FL
MONOCYTES # BLD AUTO: 0.8 K/UL
MONOCYTES NFR BLD AUTO: 9 %
NEUTROPHILS # BLD AUTO: 5.36 K/UL
NEUTROPHILS NFR BLD AUTO: 60.4 %
NONHDLC SERPL-MCNC: 166 MG/DL
PLATELET # BLD AUTO: 264 K/UL
POTASSIUM SERPL-SCNC: 4.4 MMOL/L
PROT SERPL-MCNC: 6.3 G/DL
RBC # BLD: 4.19 M/UL
RBC # FLD: 12.9 %
SODIUM SERPL-SCNC: 138 MMOL/L
TRIGL SERPL-MCNC: 123 MG/DL
WBC # FLD AUTO: 8.89 K/UL

## 2021-02-23 ENCOUNTER — APPOINTMENT (OUTPATIENT)
Dept: INTERNAL MEDICINE | Facility: CLINIC | Age: 81
End: 2021-02-23
Payer: MEDICARE

## 2021-02-23 VITALS
HEIGHT: 60 IN | TEMPERATURE: 98.3 F | DIASTOLIC BLOOD PRESSURE: 70 MMHG | HEART RATE: 80 BPM | SYSTOLIC BLOOD PRESSURE: 140 MMHG | OXYGEN SATURATION: 98 % | WEIGHT: 126 LBS | BODY MASS INDEX: 24.74 KG/M2

## 2021-02-23 DIAGNOSIS — E11.43 TYPE 2 DIABETES MELLITUS WITH DIABETIC AUTONOMIC (POLY)NEUROPATHY: ICD-10-CM

## 2021-02-23 DIAGNOSIS — G47.00 INSOMNIA, UNSPECIFIED: ICD-10-CM

## 2021-02-23 DIAGNOSIS — Z23 ENCOUNTER FOR IMMUNIZATION: ICD-10-CM

## 2021-02-23 PROCEDURE — 99072 ADDL SUPL MATRL&STAF TM PHE: CPT

## 2021-02-23 PROCEDURE — 99214 OFFICE O/P EST MOD 30 MIN: CPT | Mod: 25

## 2021-02-23 RX ORDER — LOSARTAN POTASSIUM AND HYDROCHLOROTHIAZIDE 12.5; 1 MG/1; MG/1
100-12.5 TABLET ORAL
Qty: 90 | Refills: 3 | Status: DISCONTINUED | COMMUNITY
Start: 2020-06-08 | End: 2021-02-23

## 2021-02-23 NOTE — HISTORY OF PRESENT ILLNESS
[Other: _____] : [unfilled] [de-identified] : f/u on ch medical issues \par \par c/o insomnia - getting bad - up all night with restless ness in her legs - feels like insects crawling on her feet / legs - does not believe in medication - looking for herbal rx Marjuana etc \par \par  GERD - since she started using combo losartan /hctz - also does not like the fact she should avoid sunlight while on this medications - does eat tomato sause - rarely steps out of home - sits in corridor - concerned for sunlight exposure - also has same cough dry with this medication wants to switch back to lisinopril she felt it worked better for her \par \par hx TIA 8/2019 -had a scare in April with left face numbness \par  was told to take Asprin 81 daily - pt self discontinued -never started atorvastatin as discussed last visit - does not believe in pharmaceutical - only like to take natural rx - pt educated risk of stroke - verbalized understands refuses to take aspirin or statin \par \par DM- \par - still taking glipizide 2.5 daily 1/2 tablet daily \par -hx of non compliance with mediations in past not taking metformin 500 -when ever she takes it her stomach crams and diarrhea , sedentary life , last eye exam was 3 years ago, when she had her cataract surgery, does not believe in vaccination\par -Does not monitor her sugars\par -Tries to eat low carbohydrate diet\par \par HTN- \par -taking amlodipine 10 mg ,Losartan 100/HCT 25 daily? compliance is an issue - takes meds at 3 pm did not take today pill - wants to switch losartan back to lisinopril \par -Denies any chest pain, palpitation, or dizzy spells, + sob with exertion and increased stress for last 3 weeks due to family issue , No h/o asthma \par -used to see cardiology last visit was 5 years ago , cardiac work up was 16 yrs ago \par

## 2021-02-23 NOTE — ASSESSMENT
[FreeTextEntry1] : \par Insomnia- sleep hygienes reviewed \par -? Restless leg syndrome vs diabetic neuropathy - refused gabapentin \par -referral to sleep sp given \par - B12 nl , get CPK levels \par \par Hypertension- 160/60 today -  - avoid canned foods , processed foods \par -uncontrolled confirmed , denies CP SOB or dizzy spells \par - change to lisinopril 20 -HCTZ 12.5 daily ( pt gets cough with both wants to switch back )\par -Educate the patient side effects of uncontrolled high blood pressure including coronary artery disease, kidney failure requiring dialysis , stroke causing paralysis and death , pt verbalized agreed to be compliant with medications \par - advised if chest tightness or pain or palpitation to go to ER \par -Elevated today, discussed low-salt diet, \par -refilled all medications \par -Followup in 3 months to check blood pressure\par \par GERD - adviced to take medications after her meals - and walk 20 minutes after meals \par -Educated patient lifestyle modification, advice to avoid fried food, greasy oily and spicy foods, avoid tomato, orange, lemon , or caffeinated beverages.\par -Avoid reclining upto 3 hours after meals\par -Followup in 3 months if no improvement consider EGD\par \par hx TIA 8/2019 - self discontinued aspirin and statin - still not taking statins as recommended \par - educated risk of stroke - pt verbalized understands does not believe in Pharmacists \par \par .high cholesterol- stopped atorvastatin she read up on article in [place] - statin causes Alzheimers dementia - she is willing to risk stroke than to get AD - she will try natural rx red yeast \par \par DM type 2 -get AIC \par -ophtho referral given again \par - discussed compliance with medications \par -Educated patient complications of uncontrolled diabetes including coronary artery disease, kidney failure, blindness, neuropathy, etc\par -Educated compliance with diet and medication\par -Also advised to make appointment to see ophthalmologist to rule out retinopathy\par -on glipizide 2.5 1/2 tab daily \par -refused pneumonia vaccine and flu vaccine \par -Advice to take statin and ACE inhibitor-- patient refused statins, \par \par Health maintenance\par -Patient verbalizes understands risk of cancer-refused colonoscopy, mammogram, Pap smear, and all vaccinations. FIT negative \par FIT 2018 feb negative. \par

## 2021-02-24 LAB
ALBUMIN SERPL ELPH-MCNC: 4.3 G/DL
ALP BLD-CCNC: 88 U/L
ALT SERPL-CCNC: 13 U/L
ANION GAP SERPL CALC-SCNC: 14 MMOL/L
AST SERPL-CCNC: 17 U/L
BILIRUB SERPL-MCNC: 0.4 MG/DL
BUN SERPL-MCNC: 23 MG/DL
CALCIUM SERPL-MCNC: 9.2 MG/DL
CHLORIDE SERPL-SCNC: 103 MMOL/L
CHOLEST SERPL-MCNC: 246 MG/DL
CK SERPL-CCNC: 95 U/L
CO2 SERPL-SCNC: 21 MMOL/L
CREAT SERPL-MCNC: 0.85 MG/DL
CREAT SPEC-SCNC: 43 MG/DL
ESTIMATED AVERAGE GLUCOSE: 137 MG/DL
GLUCOSE SERPL-MCNC: 176 MG/DL
HBA1C MFR BLD HPLC: 6.4 %
HDLC SERPL-MCNC: 72 MG/DL
LDLC SERPL CALC-MCNC: 144 MG/DL
MAGNESIUM SERPL-MCNC: 2.2 MG/DL
MICROALBUMIN 24H UR DL<=1MG/L-MCNC: 1.8 MG/DL
MICROALBUMIN/CREAT 24H UR-RTO: 41 MG/G
NONHDLC SERPL-MCNC: 174 MG/DL
POTASSIUM SERPL-SCNC: 4.1 MMOL/L
PROT SERPL-MCNC: 6.6 G/DL
SODIUM SERPL-SCNC: 138 MMOL/L
TRIGL SERPL-MCNC: 150 MG/DL

## 2021-03-24 ENCOUNTER — APPOINTMENT (OUTPATIENT)
Dept: PULMONOLOGY | Facility: CLINIC | Age: 81
End: 2021-03-24

## 2021-04-27 ENCOUNTER — APPOINTMENT (OUTPATIENT)
Dept: OPHTHALMOLOGY | Facility: CLINIC | Age: 81
End: 2021-04-27

## 2021-08-11 ENCOUNTER — APPOINTMENT (OUTPATIENT)
Dept: INTERNAL MEDICINE | Facility: CLINIC | Age: 81
End: 2021-08-11

## 2021-08-12 ENCOUNTER — NON-APPOINTMENT (OUTPATIENT)
Age: 81
End: 2021-08-12

## 2021-08-12 ENCOUNTER — APPOINTMENT (OUTPATIENT)
Dept: INTERNAL MEDICINE | Facility: CLINIC | Age: 81
End: 2021-08-12
Payer: MEDICARE

## 2021-08-12 VITALS
SYSTOLIC BLOOD PRESSURE: 132 MMHG | DIASTOLIC BLOOD PRESSURE: 60 MMHG | HEIGHT: 60 IN | WEIGHT: 121 LBS | HEART RATE: 74 BPM | TEMPERATURE: 98.3 F | OXYGEN SATURATION: 98 % | BODY MASS INDEX: 23.75 KG/M2

## 2021-08-12 DIAGNOSIS — R06.02 SHORTNESS OF BREATH: ICD-10-CM

## 2021-08-12 DIAGNOSIS — Z01.818 ENCOUNTER FOR OTHER PREPROCEDURAL EXAMINATION: ICD-10-CM

## 2021-08-12 PROCEDURE — 99214 OFFICE O/P EST MOD 30 MIN: CPT

## 2021-08-13 PROBLEM — Z01.818 PRE-OPERATIVE CLEARANCE: Status: ACTIVE | Noted: 2021-08-12

## 2021-08-13 NOTE — ASSESSMENT
[FreeTextEntry4] : 80F PMH HTN, HLD, TIA, DM2 (no insulin, c/b neuropathy, GERD, cataract of the R eye s/p surgery 7 years ago who presents for pre-op evaluation for cataract surgery of the left eye. Her blood pressure and glycemic control have improved and she has been adherent. Patient has RCRI score 1 for low-risk procedure.\par - C/w current blood pressure medications\par - C/w glipizide for diabetes, patient may hold the morning of the procedure.\par \par She denies any cardiovascular, pulmonary, GI symptoms, or neurological symptoms besides from her chronic neuropathy.\par She has been compliant with her BP medications (Amlodipine 10 mg, lisinopil-HCTZ 20-12.5 mg), her BP control is improved. \par She is compliant with her diabetes medication (Glipizide ER 2.5 mg), does not take Metformin due to side GI side effect, recent A1c 6.4%. \par She is non-adherent to statin therapy or ASA (hx TIA).\par \par Functional status is good, reports she is always on her feet, she said she was walking for about 8-hrs the other day, endorses no issue climbing stairs.

## 2021-08-13 NOTE — HISTORY OF PRESENT ILLNESS
[Aortic Stenosis] : no aortic stenosis [Atrial Fibrillation] : no atrial fibrillation [Coronary Artery Disease] : no coronary artery disease [Recent Myocardial Infarction] : no recent myocardial infarction [Implantable Device/Pacemaker] : no implantable device/pacemaker [Asthma] : no asthma [COPD] : no COPD [Sleep Apnea] : no sleep apnea [Smoker] : not a smoker [Family Member] : no family member with adverse anesthesia reaction/sudden death [Self] : no previous adverse anesthesia reaction [Chronic Kidney Disease] : no chronic kidney disease [FreeTextEntry1] : Cataract surgery Left eye [FreeTextEntry2] : 9/1/21 [FreeTextEntry3] : Dr. Kieran Null, Fax [phone] [FreeTextEntry4] : 80F PMH HTN, HLD, TIA, DM2 (no insulin, c/b neuropathy, GERD, cataract of the R eye s/p surgery 7 years ago who presents for pre-op evaluation for cataract surgery of the left eye.\par \par She denies any cardiovascular, pulmonary, GI symptoms, or neurological symptoms besides from her chronic neuropathy.\par She has been compliant with her BP medications (Amlodipine 10 mg, lisinopil-HCTZ 20-12.5 mg), her BP control is improved. \par She is compliant with her diabetes medication (Glipizide ER 2.5 mg), does not take Metformin due to side GI side effect, recent A1c 6.4%. \par She is non-adherent to statin therapy or ASA (hx TIA).\par \par Functional status is good, reports she is always on her feet, she said she was walking for about 8-hrs the other day, endorses no issue climbing stairs. [FreeTextEntry7] : EKG 2/23/17 - sinus bradycardia 58 bpm.

## 2021-08-13 NOTE — REVIEW OF SYSTEMS
[Fever] : no fever [Chills] : no chills [Fatigue] : no fatigue [Discharge] : no discharge [Pain] : no pain [Redness] : no redness [Nasal Discharge] : no nasal discharge [Sore Throat] : no sore throat [Postnasal Drip] : no postnasal drip [Chest Pain] : no chest pain [Palpitations] : no palpitations [Leg Claudication] : no leg claudication [Lower Ext Edema] : no lower extremity edema [Orthopnea] : no orthopnea [Paroxysmal Nocturnal Dyspnea] : no paroxysmal nocturnal dyspnea [Shortness Of Breath] : no shortness of breath [Wheezing] : no wheezing [Cough] : no cough [Dyspnea on Exertion] : no dyspnea on exertion [Abdominal Pain] : no abdominal pain [Nausea] : no nausea [Constipation] : no constipation [Vomiting] : no vomiting [Heartburn] : no heartburn [Dysuria] : no dysuria [Itching] : no itching [Skin Rash] : no skin rash [Headache] : no headache [Dizziness] : no dizziness [Fainting] : no fainting [Unsteady Walking] : no ataxia [Easy Bleeding] : no easy bleeding [Easy Bruising] : no easy bruising

## 2021-11-01 ENCOUNTER — APPOINTMENT (OUTPATIENT)
Dept: INTERNAL MEDICINE | Facility: CLINIC | Age: 81
End: 2021-11-01
Payer: MEDICARE

## 2021-11-01 VITALS
WEIGHT: 121 LBS | HEIGHT: 60 IN | DIASTOLIC BLOOD PRESSURE: 64 MMHG | TEMPERATURE: 98.5 F | OXYGEN SATURATION: 98 % | BODY MASS INDEX: 23.75 KG/M2 | SYSTOLIC BLOOD PRESSURE: 146 MMHG | HEART RATE: 82 BPM

## 2021-11-01 VITALS — DIASTOLIC BLOOD PRESSURE: 64 MMHG | SYSTOLIC BLOOD PRESSURE: 138 MMHG

## 2021-11-01 PROCEDURE — G0439: CPT

## 2021-11-01 PROCEDURE — G0444 DEPRESSION SCREEN ANNUAL: CPT

## 2021-11-01 PROCEDURE — G0442 ANNUAL ALCOHOL SCREEN 15 MIN: CPT

## 2021-11-01 NOTE — HISTORY OF PRESENT ILLNESS
[Other: _____] : [unfilled] [de-identified] : 81F PMH HTN, HLD, TIA, DM2 (no insulin, c/b neuropathy, GERD, cataract of the R eye s/p surgery 7 years ago who presents for annual check up \par busy with 3 cats in home \par \par s/p Cataract surgery Left eye date of Procedure: 9/1/21 - was a long procedure - bad experience \par \par hx TIA 8/2019 -had a scare in April with left face numbness \par  was told to take Asprin 81 daily - pt self discontinued -never started atorvastatin as discussed last visit - does not believe in pharmaceutical - only like to take natural rx - pt educated risk of stroke - verbalized understands refuses to take aspirin or statin \par \par DM- \par - still taking glipizide 2.5 daily 1/2 tablet daily \par -hx of non compliance with mediations in past not taking metformin 500 -when ever she takes it her stomach crams and diarrhea , sedentary life , last eye exam was 3 years ago, when she had her cataract surgery, does not believe in vaccination\par -Does not monitor her sugars\par -Tries to eat low carbohydrate diet\par \par HTN- \par -taking amlodipine 10 mg ,Losartan 100/HCT 25 daily \par -Denies any chest pain, palpitation, or dizzy spells, + sob with exertion and increased stress for last 3 weeks due to family issue , No h/o asthma \par -used to see cardiology last visit was 4 years ago , cardiac work up was 15 yrs ago \par

## 2021-11-01 NOTE — HEALTH RISK ASSESSMENT
[Good] : ~his/her~  mood as  good [No] : No [No falls in past year] : Patient reported no falls in the past year [0] : 2) Feeling down, depressed, or hopeless: Not at all (0) [PHQ-2 Negative - No further assessment needed] : PHQ-2 Negative - No further assessment needed [Alone] : lives alone [Retired] : retired [Single] : single [Fully functional (bathing, dressing, toileting, transferring, walking, feeding)] : Fully functional (bathing, dressing, toileting, transferring, walking, feeding) [Independent] : managing finances [Full assistance needed] : using transportation [] : No [de-identified] : walking  [[id number]] : 0 [Reports changes in hearing] : Reports no changes in hearing [Reports changes in vision] : Reports no changes in vision [Reports changes in dental health] : Reports no changes in dental health

## 2021-11-01 NOTE — ASSESSMENT
[FreeTextEntry1] : \par hx TIA 8/2019 - self discontinued aspirin and statin \par - educated risk of stroke - pt verbalized understands does not believe in Pharmacists \par \par .high cholesterol-not taking  atorvastatin she read up on article in [place] - statin causes Alzheimers dementia - she is willing to risk stroke than to get AD - she will try natural rx red yeast \par \par Hypertension- 138/64 today - better -avoid canned foods , processed foods \par -uncontrolled confirmed , denies CP SOB or dizzy spells \par -Educate the patient side effects of uncontrolled high blood pressure including coronary artery disease, kidney failure requiring dialysis , stroke causing paralysis and death , pt verbalized agreed to be compliant with medications \par - advised if chest tightness or pain or palpitation to go to ER \par -Elevated today, discussed low-salt diet, \par -refilled all medications \par -Followup in 3 months to check blood pressure\par \par DM type 2 -get AIC \par -AIC 6.4 2/2021 \par -ophtho referral given \par - discussed compliance with medications \par -Educated patient complications of uncontrolled diabetes including coronary artery disease, kidney failure, blindness, neuropathy, etc\par -Educated compliance with diet and medication\par -Also advised to make appointment to see ophthalmologist to rule out retinopathy\par -on glipizide 2.5 1/2 tab daily \par -refused pneumonia vaccine and flu vaccine \par -Advice to take statin and ACE inhibitor-- patient refused statins, \par \par GERD - adviced to take medications after her meals - and walk 20 minutes after meals \par -Educated patient lifestyle modification, advice to avoid fried food, greasy oily and spicy foods, avoid tomato, orange, lemon , or caffeinated beverages.\par -Avoid reclining upto 3 hours after meals\par -Followup in 3 months if no improvement consider EGD\par \par Insomnia- sleep hygienes reviewed \par - start melatonin - increase to 8mg daily \par \par Health maintenance\par -Patient verbalizes understands risk of cancer-refused colonoscopy, mammogram, Pap smear, and all vaccinations. FIT negative \par FIT 2018 feb negative.\par covid vaccine - refused

## 2021-11-03 LAB
25(OH)D3 SERPL-MCNC: 59.9 NG/ML
ALBUMIN SERPL ELPH-MCNC: 4 G/DL
ALP BLD-CCNC: 83 U/L
ALT SERPL-CCNC: 12 U/L
ANION GAP SERPL CALC-SCNC: 12 MMOL/L
APPEARANCE: CLEAR
AST SERPL-CCNC: 18 U/L
BASOPHILS # BLD AUTO: 0.03 K/UL
BASOPHILS NFR BLD AUTO: 0.4 %
BILIRUB SERPL-MCNC: 0.4 MG/DL
BILIRUBIN URINE: NEGATIVE
BLOOD URINE: NEGATIVE
BUN SERPL-MCNC: 26 MG/DL
CALCIUM SERPL-MCNC: 8.8 MG/DL
CHLORIDE SERPL-SCNC: 105 MMOL/L
CHOLEST SERPL-MCNC: 216 MG/DL
CO2 SERPL-SCNC: 21 MMOL/L
COLOR: YELLOW
CREAT SERPL-MCNC: 0.79 MG/DL
EOSINOPHIL # BLD AUTO: 0.2 K/UL
EOSINOPHIL NFR BLD AUTO: 2.8 %
ESTIMATED AVERAGE GLUCOSE: 131 MG/DL
GLUCOSE QUALITATIVE U: NEGATIVE
GLUCOSE SERPL-MCNC: 183 MG/DL
HBA1C MFR BLD HPLC: 6.2 %
HCT VFR BLD CALC: 34.9 %
HDLC SERPL-MCNC: 68 MG/DL
HGB BLD-MCNC: 11.3 G/DL
IMM GRANULOCYTES NFR BLD AUTO: 0.3 %
KETONES URINE: NEGATIVE
LDLC SERPL CALC-MCNC: 131 MG/DL
LEUKOCYTE ESTERASE URINE: NEGATIVE
LYMPHOCYTES # BLD AUTO: 1.72 K/UL
LYMPHOCYTES NFR BLD AUTO: 23.9 %
MAN DIFF?: NORMAL
MCHC RBC-ENTMCNC: 28.7 PG
MCHC RBC-ENTMCNC: 32.4 GM/DL
MCV RBC AUTO: 88.6 FL
MONOCYTES # BLD AUTO: 0.67 K/UL
MONOCYTES NFR BLD AUTO: 9.3 %
NEUTROPHILS # BLD AUTO: 4.56 K/UL
NEUTROPHILS NFR BLD AUTO: 63.3 %
NITRITE URINE: NEGATIVE
NONHDLC SERPL-MCNC: 148 MG/DL
PH URINE: 5.5
PLATELET # BLD AUTO: 264 K/UL
POTASSIUM SERPL-SCNC: 4.3 MMOL/L
PROT SERPL-MCNC: 6 G/DL
PROTEIN URINE: NORMAL
RBC # BLD: 3.94 M/UL
RBC # FLD: 13.3 %
SODIUM SERPL-SCNC: 138 MMOL/L
SPECIFIC GRAVITY URINE: 1.03
TRIGL SERPL-MCNC: 87 MG/DL
TSH SERPL-ACNC: 1.69 UIU/ML
UROBILINOGEN URINE: NORMAL
VIT B12 SERPL-MCNC: 1686 PG/ML
WBC # FLD AUTO: 7.2 K/UL

## 2022-01-26 ENCOUNTER — APPOINTMENT (OUTPATIENT)
Dept: INTERNAL MEDICINE | Facility: CLINIC | Age: 82
End: 2022-01-26
Payer: MEDICARE

## 2022-01-26 VITALS
OXYGEN SATURATION: 97 % | BODY MASS INDEX: 23.36 KG/M2 | DIASTOLIC BLOOD PRESSURE: 58 MMHG | SYSTOLIC BLOOD PRESSURE: 126 MMHG | HEIGHT: 60 IN | HEART RATE: 75 BPM | WEIGHT: 119 LBS | TEMPERATURE: 98.3 F

## 2022-01-26 PROCEDURE — 99214 OFFICE O/P EST MOD 30 MIN: CPT

## 2022-01-26 NOTE — HISTORY OF PRESENT ILLNESS
[Other: _____] : [unfilled] [de-identified] : \par 81F PMH HTN, HLD, TIA, DM2 (no insulin, c/b neuropathy, GERD, cataract of the R eye s/p surgery 7 years ago who presents forf/u  on  medical issues - also has acute complaint \par busy with 3 cats in home \par \par neck pain left side x 3 weeks - son spotted a big pimple at the site - it was small now sloweliy increasing - then it was pustular - \par no rash or fever \par \par s/p Cataract surgery Left eye date of Procedure: 9/1/21 - was a long procedure - bad experience \par \par hx TIA 8/2019 -had a scare in April with left face numbness \par  was told to take Asprin 81 daily - pt self discontinued -never started atorvastatin as discussed last visit - does not believe in pharmaceutical - only like to take natural rx - pt educated risk of stroke - verbalized understands refuses to take aspirin or statin \par \par DM- \par - still taking glipizide 2.5 daily 1/2 tablet daily \par -hx of non compliance with mediations in past not taking metformin 500 -when ever she takes it her stomach crams and diarrhea , sedentary life , last eye exam was 3 years ago, when she had her cataract surgery, does not believe in vaccination\par -Does not monitor her sugars\par -Tries to eat low carbohydrate diet\par \par HTN- \par -taking amlodipine 10 mg ,Losartan 100/HCT 25 daily \par -Denies any chest pain, palpitation, or dizzy spells, + sob with exertion and increased stress for last 3 weeks due to family issue , No h/o asthma \par -used to see cardiology last visit was 4 years ago , cardiac work up was 15 yrs ago \par

## 2022-01-26 NOTE — ASSESSMENT
[FreeTextEntry1] : NEck muscle spasm \par - advised to avoid pulling pushing , lifting , carrying weights , bending etc \par - do warm compresses \par - start Flexeril 5 mg qhs as needed - no driving \par -naproxen 500 po BID with food \par - side effects reviewed \par - rtc if no improvement\par \par Small nodular lesion left neck - was pustular now hard -non tender  ? keloid - referral to see derm given \par \par hx TIA 8/2019 - self discontinued aspirin and statin \par - educated risk of stroke - pt verbalized understands does not believe in Pharmacists \par \par .high cholesterol-not taking atorvastatin she read up on article in [place] - statin causes Alzheimers dementia - she is willing to risk stroke than to get AD - she will try natural rx red yeast \par \par Hypertension- 138/64 today - better -avoid canned foods , processed foods \par -uncontrolled confirmed , denies CP SOB or dizzy spells \par -Educate the patient side effects of uncontrolled high blood pressure including coronary artery disease, kidney failure requiring dialysis , stroke causing paralysis and death , pt verbalized agreed to be compliant with medications \par - advised if chest tightness or pain or palpitation to go to ER \par -Elevated today, discussed low-salt diet, \par -refilled all medications \par -Followup in 3 months to check blood pressure\par \par DM type 2 -get AIC \par -AIC 6.4 2/2021 \par -ophtho referral given \par - discussed compliance with medications \par -Educated patient complications of uncontrolled diabetes including coronary artery disease, kidney failure, blindness, neuropathy, etc\par -Educated compliance with diet and medication\par -Also advised to make appointment to see ophthalmologist to rule out retinopathy\par -on glipizide 2.5 1/2 tab daily \par -refused pneumonia vaccine and flu vaccine \par -Advice to take statin and ACE inhibitor-- patient refused statins, \par \par GERD - adviced to take medications after her meals - and walk 20 minutes after meals \par -Educated patient lifestyle modification, advice to avoid fried food, greasy oily and spicy foods, avoid tomato, orange, lemon , or caffeinated beverages.\par -Avoid reclining upto 3 hours after meals\par -Followup in 3 months if no improvement consider EGD\par \par Insomnia- sleep hygienes reviewed \par - start melatonin - increase to 8mg daily \par \par Health maintenance\par -Patient verbalizes understands risk of cancer-refused colonoscopy, mammogram, Pap smear, and all vaccinations. FIT negative \par FIT 2018 feb negative.\par covid vaccine - refused. \par

## 2022-01-26 NOTE — REVIEW OF SYSTEMS
[Fever] : no fever [Chills] : no chills [Skin Rash] : skin rash [Negative] : Gastrointestinal [FreeTextEntry4] : left neck pain

## 2022-01-26 NOTE — PHYSICAL EXAM
[No Lymphadenopathy] : no lymphadenopathy [Normal] : soft, non-tender, non-distended, no masses palpated, no HSM and normal bowel sounds [de-identified] : restricted neck movement to left side flexion  [de-identified] : Small nodular lesion left neck - was pustular now hard -non tender  ? keloid

## 2022-01-30 NOTE — PHYSICAL EXAM
[Normal] : soft, non-tender, non-distended, no masses palpated, no HSM and normal bowel sounds Click to add…

## 2022-05-04 ENCOUNTER — APPOINTMENT (OUTPATIENT)
Dept: INTERNAL MEDICINE | Facility: CLINIC | Age: 82
End: 2022-05-04
Payer: MEDICARE

## 2022-05-04 VITALS
DIASTOLIC BLOOD PRESSURE: 84 MMHG | HEART RATE: 71 BPM | WEIGHT: 122 LBS | HEIGHT: 60 IN | OXYGEN SATURATION: 98 % | TEMPERATURE: 98.1 F | BODY MASS INDEX: 23.95 KG/M2 | SYSTOLIC BLOOD PRESSURE: 184 MMHG

## 2022-05-04 DIAGNOSIS — M62.838 OTHER MUSCLE SPASM: ICD-10-CM

## 2022-05-04 PROCEDURE — 99214 OFFICE O/P EST MOD 30 MIN: CPT | Mod: 25

## 2022-05-04 RX ORDER — NAPROXEN 500 MG/1
500 TABLET ORAL
Qty: 10 | Refills: 0 | Status: DISCONTINUED | COMMUNITY
Start: 2022-01-26 | End: 2022-05-04

## 2022-05-04 RX ORDER — CYCLOBENZAPRINE HYDROCHLORIDE 5 MG/1
5 TABLET, FILM COATED ORAL
Qty: 7 | Refills: 0 | Status: DISCONTINUED | COMMUNITY
Start: 2022-01-26 | End: 2022-05-04

## 2022-05-04 NOTE — HISTORY OF PRESENT ILLNESS
[de-identified] : 81F PMH HTN, HLD, TIA, DM2 (no insulin, c/b neuropathy, GERD, cataract of the R eye s/p surgery 7 years ago who presents for f/u on  medical issues \par busy with 3 cats in home \par \par s/p Cataract surgery Left eye date of Procedure: 9/1/21 - was a long procedure - bad experience \par \par hx TIA 8/2019 -had a scare in April with left face numbness \par  was told to take Asprin 81 daily - pt self discontinued -never started atorvastatin as discussed last visit - does not believe in pharmaceutical - only like to take natural rx - pt educated risk of stroke - verbalized understands refuses to take aspirin or statin \par \par DM- \par - still taking glipizide 2.5 daily 1/2 tablet daily \par -hx of non compliance with mediations in past not taking metformin 500 -when ever she takes it her stomach crams and diarrhea , sedentary life , last eye exam was 3 years ago, when she had her cataract surgery, does not believe in vaccination\par -Does not monitor her sugars\par -Tries to eat low carbohydrate diet\par \par HTN- \par -taking amlodipine 10 mg ,Losartan 100/HCT 25 daily \par -Denies any chest pain, palpitation, or dizzy spells, + sob with exertion and increased stress for last 3 weeks due to family issue , No h/o asthma \par -used to see cardiology last visit was 4 years ago , cardiac work up was 15 yrs ago \par  \par

## 2022-05-04 NOTE — ASSESSMENT
[FreeTextEntry1] : Hypertension- 180/64  today - missed few pills every few weeks - family stress + \par -discussed compliance with medications \par - better -avoid canned foods , processed foods \par -uncontrolled confirmed , denies CP SOB or dizzy spells \par -Educate the patient side effects of uncontrolled high blood pressure including coronary artery disease, kidney failure requiring dialysis , stroke causing paralysis and death , pt verbalized agreed to be compliant with medications \par - advised if chest tightness or pain or palpitation to go to ER \par -Elevated today, discussed low-salt diet, \par -refilled all medications \par -Followup in 4 weeks check blood pressure\par \par Small nodular lesion left neck - was pustular now hard -non tender ? keloid - referral to see derm given \par \par hx TIA 8/2019 - self discontinued aspirin and statin \par - educated risk of stroke - pt verbalized understands does not believe in Pharmacists \par \par .high cholesterol-not taking atorvastatin she read up on article in [place] - statin causes Alzheimers dementia - she is willing to risk stroke than to get AD - she will try natural rx red yeast \par \par DM type 2 -get AIC \par -AIC 6.2 11/2021 \par -ophtho referral given \par - discussed compliance with medications \par -Educated patient complications of uncontrolled diabetes including coronary artery disease, kidney failure, blindness, neuropathy, etc\par -Educated compliance with diet and medication\par -Also advised to make appointment to see ophthalmologist to rule out retinopathy\par -on glipizide 2.5 1/2 tab daily \par -refused pneumonia vaccine and flu vaccine \par -Advice to take statin and ACE inhibitor-- patient refused statins, \par \par GERD - adviced to take medications after her meals - and walk 20 minutes after meals \par -Educated patient lifestyle modification, advice to avoid fried food, greasy oily and spicy foods, avoid tomato, orange, lemon , or caffeinated beverages.\par -Avoid reclining upto 3 hours after meals\par -Followup in 3 months if no improvement consider EGD\par \par Insomnia- sleep hygienes reviewed \par - start melatonin - increase to 8mg daily \par \par Health maintenance\par -Patient verbalizes understands risk of cancer-refused colonoscopy, mammogram, Pap smear, and all vaccinations. FIT negative \par FIT 2018 feb negative.\par covid vaccine - refused. \par  \par 
Other Specify

## 2022-05-05 LAB
ALBUMIN SERPL ELPH-MCNC: 4 G/DL
ANION GAP SERPL CALC-SCNC: 14 MMOL/L
BUN SERPL-MCNC: 22 MG/DL
CALCIUM SERPL-MCNC: 9.3 MG/DL
CHLORIDE SERPL-SCNC: 104 MMOL/L
CHOLEST SERPL-MCNC: 234 MG/DL
CO2 SERPL-SCNC: 22 MMOL/L
CREAT SERPL-MCNC: 0.84 MG/DL
EGFR: 69 ML/MIN/1.73M2
ESTIMATED AVERAGE GLUCOSE: 140 MG/DL
GLUCOSE SERPL-MCNC: 174 MG/DL
HBA1C MFR BLD HPLC: 6.5 %
HDLC SERPL-MCNC: 76 MG/DL
LDLC SERPL CALC-MCNC: 138 MG/DL
NONHDLC SERPL-MCNC: 158 MG/DL
PHOSPHATE SERPL-MCNC: 4 MG/DL
POTASSIUM SERPL-SCNC: 4.9 MMOL/L
SODIUM SERPL-SCNC: 140 MMOL/L
TRIGL SERPL-MCNC: 99 MG/DL

## 2022-06-15 ENCOUNTER — APPOINTMENT (OUTPATIENT)
Dept: INTERNAL MEDICINE | Facility: CLINIC | Age: 82
End: 2022-06-15
Payer: MEDICARE

## 2022-06-15 VITALS
OXYGEN SATURATION: 98 % | WEIGHT: 121 LBS | HEART RATE: 72 BPM | BODY MASS INDEX: 23.75 KG/M2 | HEIGHT: 60 IN | DIASTOLIC BLOOD PRESSURE: 60 MMHG | TEMPERATURE: 98.3 F | SYSTOLIC BLOOD PRESSURE: 171 MMHG

## 2022-06-15 VITALS — SYSTOLIC BLOOD PRESSURE: 150 MMHG | DIASTOLIC BLOOD PRESSURE: 60 MMHG

## 2022-06-15 DIAGNOSIS — K21.9 GASTRO-ESOPHAGEAL REFLUX DISEASE W/OUT ESOPHAGITIS: ICD-10-CM

## 2022-06-15 PROCEDURE — 99213 OFFICE O/P EST LOW 20 MIN: CPT

## 2022-06-15 NOTE — HISTORY OF PRESENT ILLNESS
[de-identified] : 81F PMH HTN, HLD, TIA, DM2 (no insulin, c/b neuropathy, GERD, cataract of the R eye s/p surgery 7 years ago who presents for f/u on  medical issues \par busy with 3 cats in home \par \par HTN- was running high last visit \par -taking amlodipine 10 mg ,Losartan 100/HCT 25 daily \par -Denies any chest pain, palpitation, or dizzy spells, + sob with exertion and increased stress for last 3 weeks due to family issue , No h/o asthma \par -used to see cardiology last visit was 4 years ago , cardiac work up was 15 yrs ago \par \par s/p Cataract surgery Left eye date of Procedure: 9/1/21 - was a long procedure - bad experience \par \par hx TIA 8/2019 -had a scare in April with left face numbness \par  was told to take Asprin 81 daily - pt self discontinued -never started atorvastatin as discussed last visit - does not believe in pharmaceutical - only like to take natural rx - pt educated risk of stroke - verbalized understands refuses to take aspirin or statin \par \par DM- \par - still taking glipizide 2.5 daily 1/2 tablet daily \par -hx of non compliance with mediations in past not taking metformin 500 -when ever she takes it her stomach crams and diarrhea , sedentary life , last eye exam was 3 years ago, when she had her cataract surgery, does not believe in vaccination\par -Does not monitor her sugars\par -Tries to eat low carbohydrate diet

## 2022-06-15 NOTE — ASSESSMENT
[FreeTextEntry1] : Hypertension- 148/60\par -discussed compliance with medications \par - better -avoid canned foods , processed foods \par -uncontrolled confirmed , denies CP SOB or dizzy spells \par -Educate the patient side effects of uncontrolled high blood pressure including coronary artery disease, kidney failure requiring dialysis , stroke causing paralysis and death , pt verbalized agreed to be compliant with medications \par - advised if chest tightness or pain or palpitation to go to ER \par -Elevated today, discussed low-salt diet, \par -refilled all medications \par -Followup in 4 weeks check blood pressure\par \par Small nodular lesion left neck - was pustular now hard -non tender ? keloid - referral to see derm given \par \par hx TIA 8/2019 - self discontinued aspirin and statin \par - educated risk of stroke - pt verbalized understands does not believe in Pharmacists \par \par .high cholesterol-not taking atorvastatin she read up on article in [place] - statin causes Alzheimers dementia - she is willing to risk stroke than to get AD - she will try natural rx red yeast \par \par DM type 2 -get AIC \par -AIC 6.2 11/2021 \par -ophtho referral given \par - discussed compliance with medications \par -Educated patient complications of uncontrolled diabetes including coronary artery disease, kidney failure, blindness, neuropathy, etc\par -Educated compliance with diet and medication\par -Also advised to make appointment to see ophthalmologist to rule out retinopathy\par -on glipizide 2.5 1/2 tab daily \par -refused pneumonia vaccine and flu vaccine \par -Advice to take statin and ACE inhibitor-- patient refused statins, \par \par GERD - adviced to take medications after her meals - and walk 20 minutes after meals \par -Educated patient lifestyle modification, advice to avoid fried food, greasy oily and spicy foods, avoid tomato, orange, lemon , or caffeinated beverages.\par -Avoid reclining upto 3 hours after meals\par -Followup in 3 months if no improvement consider EGD\par \par Insomnia- sleep hygienes reviewed \par - start melatonin - increase to 8mg daily \par \par Health maintenance\par -Patient verbalizes understands risk of cancer-refused colonoscopy, mammogram, Pap smear, and all vaccinations. FIT negative \par FIT 2018 feb negative.\par covid vaccine - refused. \par  \par

## 2022-11-16 ENCOUNTER — APPOINTMENT (OUTPATIENT)
Dept: INTERNAL MEDICINE | Facility: CLINIC | Age: 82
End: 2022-11-16

## 2022-12-19 RX ORDER — GLIPIZIDE 2.5 MG/1
2.5 TABLET, FILM COATED, EXTENDED RELEASE ORAL DAILY
Qty: 90 | Refills: 3 | Status: ACTIVE | COMMUNITY
Start: 2017-09-29 | End: 1900-01-01

## 2023-02-10 ENCOUNTER — NON-APPOINTMENT (OUTPATIENT)
Age: 83
End: 2023-02-10

## 2023-03-31 ENCOUNTER — APPOINTMENT (OUTPATIENT)
Dept: INTERNAL MEDICINE | Facility: CLINIC | Age: 83
End: 2023-03-31
Payer: MEDICARE

## 2023-03-31 ENCOUNTER — LABORATORY RESULT (OUTPATIENT)
Age: 83
End: 2023-03-31

## 2023-03-31 VITALS
SYSTOLIC BLOOD PRESSURE: 150 MMHG | TEMPERATURE: 98.2 F | OXYGEN SATURATION: 96 % | DIASTOLIC BLOOD PRESSURE: 66 MMHG | WEIGHT: 118 LBS | HEART RATE: 78 BPM | BODY MASS INDEX: 23.05 KG/M2

## 2023-03-31 DIAGNOSIS — R05.8 OTHER SPECIFIED COUGH: ICD-10-CM

## 2023-03-31 DIAGNOSIS — Z00.00 ENCOUNTER FOR GENERAL ADULT MEDICAL EXAMINATION W/OUT ABNORMAL FINDINGS: ICD-10-CM

## 2023-03-31 DIAGNOSIS — G56.00 CARPAL TUNNEL SYNDROME, UNSPECIFIED UPPER LIMB: ICD-10-CM

## 2023-03-31 DIAGNOSIS — E08.40 DIABETES MELLITUS DUE TO UNDERLYING CONDITION WITH DIABETIC NEUROPATHY, UNSPECIFIED: Chronic | ICD-10-CM

## 2023-03-31 PROCEDURE — G0439: CPT

## 2023-03-31 NOTE — ASSESSMENT
[FreeTextEntry1] : CTS - advised to ice , brace and Rest \par \par Multiple joint pains , swelling stiffness - get arthritis panel \par referral to see pain management given \par \par Hypertension- 160/60\par -discussed compliance with medications \par - better -avoid canned foods , processed foods \par -uncontrolled confirmed , denies CP SOB or dizzy spells \par -Educate the patient side effects of uncontrolled high blood pressure including coronary artery disease, kidney failure requiring dialysis , stroke causing paralysis and death , pt verbalized agreed to be compliant with medications \par - advised if chest tightness or pain or palpitation to go to ER \par -Elevated today, discussed low-salt diet, \par -refilled all medications \par -Followup in 4 weeks check blood pressure\par \par Small nodular lesion left neck - was pustular now hard -non tender ? keloid - referral to see derm given \par \par hx TIA 8/2019 - self discontinued aspirin and statin \par - educated risk of stroke - pt verbalized understands does not believe in Pharmacists \par \par .high cholesterol-not taking atorvastatin she read up on article in [place] - statin causes Alzheimers dementia - she is willing to risk stroke than to get AD - she will try natural rx red yeast \par \par DM type 2 -get AIC \par -AIC 6.5  5/2022  \par -ophtho referral given \par - discussed compliance with medications \par -Educated patient complications of uncontrolled diabetes including coronary artery disease, kidney failure, blindness, neuropathy, etc\par -Educated compliance with diet and medication\par -Also advised to make appointment to see ophthalmologist to rule out retinopathy\par -on glipizide 2.5 1/2 tab daily \par -refused pneumonia vaccine and flu vaccine \par -Advice to take statin and ACE inhibitor-- patient refused statins, \par \par GERD - adviced to take medications after her meals - and walk 20 minutes after meals \par -Educated patient lifestyle modification, advice to avoid fried food, greasy oily and spicy foods, avoid tomato, orange, lemon , or caffeinated beverages.\par -Avoid reclining upto 3 hours after meals\par -Followup in 3 months if no improvement consider EGD\par \par Insomnia- sleep hygienes reviewed \par - start melatonin - increase to 8mg daily \par \par Health maintenance\par -Patient verbalizes understands risk of cancer-refused colonoscopy, mammogram, Pap smear, and all vaccinations. FIT negative \par FIT 2018 feb negative.\par covid vaccine - refused. \par

## 2023-03-31 NOTE — HEALTH RISK ASSESSMENT
[Good] : ~his/her~  mood as  good [No] : In the past 12 months have you used drugs other than those required for medical reasons? No [No falls in past year] : Patient reported no falls in the past year [0] : 2) Feeling down, depressed, or hopeless: Not at all (0) [PHQ-2 Negative - No further assessment needed] : PHQ-2 Negative - No further assessment needed [Alone] : lives alone [Single] : single [Independent] : managing medications [Some assistance needed] : managing finances [Full assistance needed] : using transportation [FreeTextEntry1] : joint pains  [de-identified] : does work in home  [[id number]] : 0 [Reports changes in hearing] : Reports no changes in hearing [Reports changes in vision] : Reports no changes in vision [Reports changes in dental health] : Reports no changes in dental health

## 2023-03-31 NOTE — HISTORY OF PRESENT ILLNESS
[Other: _____] : [unfilled] [de-identified] : 82 F PMH HTN, HLD, TIA, DM2 (no insulin, c/b neuropathy, GERD, cataract of the R eye s/p surgery 7 years ago who presents for f/u on AWV \par busy with 3 cats in home \par \par c/o pain in joints multiple -hand pain swelling - cannot do gardening , dropping things - leg pain cramps in legs keeps her up all night , ankle pains etc - wants to get Medical CBD/ Marijuana \par \par HTN- was running high last visit \par -taking amlodipine 10 mg ,Losartan 100/HCT 25 daily \par -Denies any chest pain, palpitation, or dizzy spells, + sob with exertion and increased stress for last 3 weeks due to family issue , No h/o asthma \par -used to see cardiology last visit was 4 years ago , cardiac work up was 15 yrs ago \par \par s/p Cataract surgery Left eye date of Procedure: 9/1/21 - was a long procedure - bad experience \par \par hx TIA 8/2019 -had a scare in April with left face numbness \par  was told to take Asprin 81 daily - pt self discontinued -never started atorvastatin as discussed last visit - does not believe in pharmaceutical - only like to take natural rx - pt educated risk of stroke - verbalized understands refuses to take aspirin or statin \par \par DM- \par - still taking glipizide 2.5 daily 1/2 tablet daily \par -hx of non compliance with mediations in past not taking metformin 500 -when ever she takes it her stomach crams and diarrhea , sedentary life , last eye exam was 3 years ago, when she had her cataract surgery, does not believe in vaccination\par -Does not monitor her sugars\par -Tries to eat low carbohydrate diet \par

## 2023-04-03 LAB
ALBUMIN SERPL ELPH-MCNC: 4.3 G/DL
ALP BLD-CCNC: 95 U/L
ALT SERPL-CCNC: 15 U/L
ANA PAT FLD IF-IMP: ABNORMAL
ANA SER IF-ACNC: ABNORMAL
ANION GAP SERPL CALC-SCNC: 16 MMOL/L
APPEARANCE: CLEAR
AST SERPL-CCNC: 19 U/L
BASOPHILS # BLD AUTO: 0.02 K/UL
BASOPHILS NFR BLD AUTO: 0.2 %
BILIRUB SERPL-MCNC: 0.4 MG/DL
BILIRUBIN URINE: NEGATIVE
BLOOD URINE: NEGATIVE
BUN SERPL-MCNC: 21 MG/DL
C TRACH RRNA SPEC QL NAA+PROBE: NOT DETECTED
CALCIUM SERPL-MCNC: 9.6 MG/DL
CHLORIDE SERPL-SCNC: 104 MMOL/L
CHOLEST SERPL-MCNC: 255 MG/DL
CO2 SERPL-SCNC: 20 MMOL/L
COLOR: ABNORMAL
CREAT SERPL-MCNC: 0.83 MG/DL
CREAT SPEC-SCNC: 120 MG/DL
CRP SERPL HS-MCNC: 2.32 MG/L
EGFR: 70 ML/MIN/1.73M2
EOSINOPHIL # BLD AUTO: 0.07 K/UL
EOSINOPHIL NFR BLD AUTO: 0.7 %
ERYTHROCYTE [SEDIMENTATION RATE] IN BLOOD BY WESTERGREN METHOD: 30 MM/HR
ESTIMATED AVERAGE GLUCOSE: 137 MG/DL
GLUCOSE QUALITATIVE U: NEGATIVE
GLUCOSE SERPL-MCNC: 158 MG/DL
HBA1C MFR BLD HPLC: 6.4 %
HCT VFR BLD CALC: 38.1 %
HDLC SERPL-MCNC: 79 MG/DL
HGB BLD-MCNC: 12.4 G/DL
IMM GRANULOCYTES NFR BLD AUTO: 0.5 %
KETONES URINE: NEGATIVE
LDLC SERPL CALC-MCNC: 140 MG/DL
LEUKOCYTE ESTERASE URINE: NEGATIVE
LYMPHOCYTES # BLD AUTO: 1.79 K/UL
LYMPHOCYTES NFR BLD AUTO: 18 %
MAN DIFF?: NORMAL
MCHC RBC-ENTMCNC: 28.6 PG
MCHC RBC-ENTMCNC: 32.5 GM/DL
MCV RBC AUTO: 87.8 FL
MICROALBUMIN 24H UR DL<=1MG/L-MCNC: 3.9 MG/DL
MICROALBUMIN/CREAT 24H UR-RTO: 33 MG/G
MONOCYTES # BLD AUTO: 0.8 K/UL
MONOCYTES NFR BLD AUTO: 8 %
N GONORRHOEA RRNA SPEC QL NAA+PROBE: NOT DETECTED
NEUTROPHILS # BLD AUTO: 7.23 K/UL
NEUTROPHILS NFR BLD AUTO: 72.6 %
NITRITE URINE: NEGATIVE
NONHDLC SERPL-MCNC: 176 MG/DL
PH URINE: 7
PLATELET # BLD AUTO: 295 K/UL
POTASSIUM SERPL-SCNC: 4.4 MMOL/L
PROT SERPL-MCNC: 6.7 G/DL
PROTEIN URINE: NORMAL
RBC # BLD: 4.34 M/UL
RBC # FLD: 13.3 %
RHEUMATOID FACT SER QL: <10 IU/ML
SODIUM SERPL-SCNC: 140 MMOL/L
SOURCE AMPLIFICATION: NORMAL
SPECIFIC GRAVITY URINE: 1.02
TRIGL SERPL-MCNC: 177 MG/DL
TSH SERPL-ACNC: 1.5 UIU/ML
URATE SERPL-MCNC: 4.5 MG/DL
UROBILINOGEN URINE: NORMAL
VIT B12 SERPL-MCNC: 1730 PG/ML
WBC # FLD AUTO: 9.96 K/UL

## 2023-04-04 LAB
CCP AB SER IA-ACNC: <8 UNITS
DSDNA AB SER-ACNC: <12 IU/ML
RF+CCP IGG SER-IMP: NEGATIVE

## 2023-10-24 ENCOUNTER — APPOINTMENT (OUTPATIENT)
Dept: INTERNAL MEDICINE | Facility: CLINIC | Age: 83
End: 2023-10-24
Payer: MEDICARE

## 2023-10-24 VITALS
OXYGEN SATURATION: 98 % | RESPIRATION RATE: 15 BRPM | DIASTOLIC BLOOD PRESSURE: 76 MMHG | HEART RATE: 75 BPM | HEIGHT: 60 IN | SYSTOLIC BLOOD PRESSURE: 203 MMHG

## 2023-10-24 VITALS — DIASTOLIC BLOOD PRESSURE: 74 MMHG | SYSTOLIC BLOOD PRESSURE: 188 MMHG

## 2023-10-24 DIAGNOSIS — M25.50 PAIN IN UNSPECIFIED JOINT: ICD-10-CM

## 2023-10-24 DIAGNOSIS — I10 ESSENTIAL (PRIMARY) HYPERTENSION: ICD-10-CM

## 2023-10-24 PROCEDURE — 99214 OFFICE O/P EST MOD 30 MIN: CPT | Mod: 25

## 2023-10-24 RX ORDER — LISINOPRIL AND HYDROCHLOROTHIAZIDE TABLETS 20; 12.5 MG/1; MG/1
20-12.5 TABLET ORAL
Qty: 180 | Refills: 1 | Status: ACTIVE | COMMUNITY
Start: 2021-02-23 | End: 1900-01-01

## 2023-10-25 LAB
ALBUMIN SERPL ELPH-MCNC: 4.3 G/DL
ALP BLD-CCNC: 111 U/L
ALT SERPL-CCNC: 14 U/L
ANION GAP SERPL CALC-SCNC: 10 MMOL/L
AST SERPL-CCNC: 30 U/L
BILIRUB SERPL-MCNC: 0.3 MG/DL
BUN SERPL-MCNC: 24 MG/DL
CALCIUM SERPL-MCNC: 9 MG/DL
CHLORIDE SERPL-SCNC: 104 MMOL/L
CHOLEST SERPL-MCNC: 245 MG/DL
CO2 SERPL-SCNC: 25 MMOL/L
CREAT SERPL-MCNC: 0.83 MG/DL
EGFR: 69 ML/MIN/1.73M2
ESTIMATED AVERAGE GLUCOSE: 137 MG/DL
GLUCOSE SERPL-MCNC: 138 MG/DL
HBA1C MFR BLD HPLC: 6.4 %
HDLC SERPL-MCNC: 79 MG/DL
LDLC SERPL CALC-MCNC: 146 MG/DL
NONHDLC SERPL-MCNC: 166 MG/DL
POTASSIUM SERPL-SCNC: 5.3 MMOL/L
PROT SERPL-MCNC: 6.8 G/DL
SODIUM SERPL-SCNC: 139 MMOL/L
TRIGL SERPL-MCNC: 119 MG/DL

## 2023-11-28 ENCOUNTER — APPOINTMENT (OUTPATIENT)
Dept: INTERNAL MEDICINE | Facility: CLINIC | Age: 83
End: 2023-11-28
Payer: MEDICARE

## 2023-11-28 VITALS
RESPIRATION RATE: 15 BRPM | HEART RATE: 60 BPM | OXYGEN SATURATION: 98 % | BODY MASS INDEX: 23.24 KG/M2 | DIASTOLIC BLOOD PRESSURE: 74 MMHG | SYSTOLIC BLOOD PRESSURE: 225 MMHG | WEIGHT: 119 LBS

## 2023-11-28 PROCEDURE — 99214 OFFICE O/P EST MOD 30 MIN: CPT | Mod: 25

## 2023-11-29 ENCOUNTER — APPOINTMENT (OUTPATIENT)
Dept: CARDIOLOGY | Facility: CLINIC | Age: 83
End: 2023-11-29
Payer: MEDICARE

## 2023-11-29 ENCOUNTER — NON-APPOINTMENT (OUTPATIENT)
Age: 83
End: 2023-11-29

## 2023-11-29 VITALS — DIASTOLIC BLOOD PRESSURE: 54 MMHG | SYSTOLIC BLOOD PRESSURE: 196 MMHG | HEART RATE: 58 BPM | OXYGEN SATURATION: 100 %

## 2023-11-29 VITALS — OXYGEN SATURATION: 100 % | HEART RATE: 60 BPM | SYSTOLIC BLOOD PRESSURE: 206 MMHG | DIASTOLIC BLOOD PRESSURE: 55 MMHG

## 2023-11-29 VITALS — OXYGEN SATURATION: 99 % | HEART RATE: 64 BPM | DIASTOLIC BLOOD PRESSURE: 56 MMHG | SYSTOLIC BLOOD PRESSURE: 184 MMHG

## 2023-11-29 VITALS
DIASTOLIC BLOOD PRESSURE: 105 MMHG | WEIGHT: 119 LBS | SYSTOLIC BLOOD PRESSURE: 175 MMHG | HEIGHT: 60 IN | OXYGEN SATURATION: 98 % | BODY MASS INDEX: 23.36 KG/M2 | HEART RATE: 66 BPM | RESPIRATION RATE: 16 BRPM

## 2023-11-29 DIAGNOSIS — I10 ESSENTIAL (PRIMARY) HYPERTENSION: ICD-10-CM

## 2023-11-29 DIAGNOSIS — Z86.73 PERSONAL HISTORY OF TRANSIENT ISCHEMIC ATTACK (TIA), AND CEREBRAL INFARCTION W/OUT RESIDUAL DEFICITS: ICD-10-CM

## 2023-11-29 DIAGNOSIS — R01.1 CARDIAC MURMUR, UNSPECIFIED: ICD-10-CM

## 2023-11-29 DIAGNOSIS — E11.9 TYPE 2 DIABETES MELLITUS W/OUT COMPLICATIONS: ICD-10-CM

## 2023-11-29 DIAGNOSIS — I49.3 VENTRICULAR PREMATURE DEPOLARIZATION: ICD-10-CM

## 2023-11-29 DIAGNOSIS — R09.89 OTHER SPECIFIED SYMPTOMS AND SIGNS INVOLVING THE CIRCULATORY AND RESPIRATORY SYSTEMS: ICD-10-CM

## 2023-11-29 DIAGNOSIS — Z71.3 DIETARY COUNSELING AND SURVEILLANCE: ICD-10-CM

## 2023-11-29 DIAGNOSIS — E78.5 HYPERLIPIDEMIA, UNSPECIFIED: ICD-10-CM

## 2023-11-29 LAB
ANION GAP SERPL CALC-SCNC: 14 MMOL/L
BUN SERPL-MCNC: 25 MG/DL
CALCIUM SERPL-MCNC: 9.4 MG/DL
CHLORIDE SERPL-SCNC: 102 MMOL/L
CO2 SERPL-SCNC: 22 MMOL/L
CREAT SERPL-MCNC: 0.85 MG/DL
EGFR: 68 ML/MIN/1.73M2
GLUCOSE SERPL-MCNC: 151 MG/DL
POTASSIUM SERPL-SCNC: 4.3 MMOL/L
SODIUM SERPL-SCNC: 137 MMOL/L

## 2023-11-29 PROCEDURE — 93000 ELECTROCARDIOGRAM COMPLETE: CPT

## 2023-11-29 PROCEDURE — 93040 RHYTHM ECG WITH REPORT: CPT | Mod: 59

## 2023-11-29 PROCEDURE — 99205 OFFICE O/P NEW HI 60 MIN: CPT | Mod: 25

## 2023-11-29 RX ORDER — ATORVASTATIN CALCIUM 40 MG/1
40 TABLET, FILM COATED ORAL
Qty: 1 | Refills: 3 | Status: ACTIVE | COMMUNITY
Start: 2019-08-03 | End: 1900-01-01

## 2023-11-30 RX ORDER — TRAZODONE HYDROCHLORIDE 50 MG/1
50 TABLET ORAL
Qty: 90 | Refills: 0 | Status: DISCONTINUED | COMMUNITY
Start: 2023-03-31 | End: 2023-11-30

## 2023-12-01 PROBLEM — R01.1 SYSTOLIC EJECTION MURMUR: Status: ACTIVE | Noted: 2023-11-29

## 2023-12-01 PROBLEM — R09.89 BRUIT OF LEFT CAROTID ARTERY: Status: ACTIVE | Noted: 2023-11-29

## 2023-12-01 PROBLEM — I10 HYPERTENSION, UNCONTROLLED: Status: ACTIVE | Noted: 2023-11-28

## 2023-12-01 PROBLEM — Z71.3 DIETARY COUNSELING AND SURVEILLANCE: Status: ACTIVE | Noted: 2023-12-01

## 2023-12-01 PROBLEM — I49.3 PVCS (PREMATURE VENTRICULAR CONTRACTIONS): Status: ACTIVE | Noted: 2023-11-29

## 2025-04-29 NOTE — PHYSICAL EXAM
[No Acute Distress] : no acute distress [Well Nourished] : well nourished [Well Developed] : well developed [No Respiratory Distress] : no respiratory distress  [Clear to Auscultation] : lungs were clear to auscultation bilaterally [No Accessory Muscle Use] : no accessory muscle use [Normal Rate] : normal rate  [Regular Rhythm] : with a regular rhythm [Normal S1, S2] : normal S1 and S2 [Soft] : abdomen soft [Non Tender] : non-tender [Normal Bowel Sounds] : normal bowel sounds Standing